# Patient Record
Sex: FEMALE | Race: OTHER | Employment: FULL TIME | URBAN - METROPOLITAN AREA
[De-identification: names, ages, dates, MRNs, and addresses within clinical notes are randomized per-mention and may not be internally consistent; named-entity substitution may affect disease eponyms.]

---

## 2017-01-23 ENCOUNTER — APPOINTMENT (OUTPATIENT)
Dept: GENERAL RADIOLOGY | Age: 47
End: 2017-01-23
Attending: EMERGENCY MEDICINE
Payer: COMMERCIAL

## 2017-01-23 ENCOUNTER — HOSPITAL ENCOUNTER (EMERGENCY)
Age: 47
Discharge: HOME | End: 2017-01-23
Attending: EMERGENCY MEDICINE
Payer: COMMERCIAL

## 2017-01-23 VITALS
SYSTOLIC BLOOD PRESSURE: 122 MMHG | TEMPERATURE: 98 F | OXYGEN SATURATION: 99 % | DIASTOLIC BLOOD PRESSURE: 73 MMHG | BODY MASS INDEX: 42.69 KG/M2 | WEIGHT: 232 LBS | RESPIRATION RATE: 20 BRPM | HEART RATE: 81 BPM | HEIGHT: 62 IN

## 2017-01-23 DIAGNOSIS — J01.11 ACUTE RECURRENT FRONTAL SINUSITIS: ICD-10-CM

## 2017-01-23 DIAGNOSIS — J40 BRONCHITIS: Primary | ICD-10-CM

## 2017-01-23 PROCEDURE — 71020 XR CHEST PA + LAT CHEST (CPT=71020): CPT

## 2017-01-23 PROCEDURE — 99283 EMERGENCY DEPT VISIT LOW MDM: CPT

## 2017-01-24 NOTE — ED INITIAL ASSESSMENT (HPI)
PT STS COUGH AND NASAL CONGESTION X 2 WEEKS. STS HAS SEEN PMD AND GIVEN ANTIBIOTICS FOR SAME. STS NO RELIEF SINCE.

## 2017-01-24 NOTE — ED PROVIDER NOTES
Patient Seen in: [facility] In [place]    History   Patient presents with:  Cough/URI    Stated Complaint: cough / sinus presssure    HPI    52-year-old female with history of chronic rhinitis among other medical problems outlined bel times daily with meals.    HYDROcodone-acetaminophen 5-325 MG Oral Tab,  1/2-2 tabs PO q 4-6 hours prn pain       Family History   Problem Relation Age of Onset   • Lipids Father    • Diabetes Father    • Hypertension Father    • Obesity Father    • Diabete respiratory distress. She has no wheezes. Abdominal: Soft. She exhibits no distension. There is no tenderness. There is no rebound. Musculoskeletal: Normal range of motion. She exhibits no tenderness.    Neurological: She is alert and oriented to person sinusitis    Disposition:  Discharge    Follow-up:  Raul Bills, [address] [id number]    Call in 1 day  Return to the ER if you feel worse in any way, Return to the ER if you have any concerns      Medications Prescri

## 2017-03-07 PROCEDURE — 87046 STOOL CULTR AEROBIC BACT EA: CPT | Performed by: FAMILY MEDICINE

## 2017-03-07 PROCEDURE — 87269 GIARDIA AG IF: CPT | Performed by: FAMILY MEDICINE

## 2017-03-07 PROCEDURE — 87015 SPECIMEN INFECT AGNT CONCNTJ: CPT | Performed by: FAMILY MEDICINE

## 2017-03-07 PROCEDURE — 87177 OVA AND PARASITES SMEARS: CPT | Performed by: FAMILY MEDICINE

## 2017-03-07 PROCEDURE — 87209 SMEAR COMPLEX STAIN: CPT | Performed by: FAMILY MEDICINE

## 2017-03-07 PROCEDURE — 87045 FECES CULTURE AEROBIC BACT: CPT | Performed by: FAMILY MEDICINE

## 2017-03-07 PROCEDURE — 87427 SHIGA-LIKE TOXIN AG IA: CPT | Performed by: FAMILY MEDICINE

## 2017-04-28 PROCEDURE — 87510 GARDNER VAG DNA DIR PROBE: CPT | Performed by: EMERGENCY MEDICINE

## 2017-04-28 PROCEDURE — 87660 TRICHOMONAS VAGIN DIR PROBE: CPT | Performed by: EMERGENCY MEDICINE

## 2017-04-28 PROCEDURE — 87480 CANDIDA DNA DIR PROBE: CPT | Performed by: EMERGENCY MEDICINE

## 2017-04-28 PROCEDURE — 87491 CHLMYD TRACH DNA AMP PROBE: CPT | Performed by: EMERGENCY MEDICINE

## 2017-04-28 PROCEDURE — 87591 N.GONORRHOEAE DNA AMP PROB: CPT | Performed by: EMERGENCY MEDICINE

## 2017-05-17 ENCOUNTER — HOSPITAL ENCOUNTER (EMERGENCY)
Age: 47
Discharge: HOME | End: 2017-05-17
Attending: EMERGENCY MEDICINE
Payer: COMMERCIAL

## 2017-05-17 ENCOUNTER — APPOINTMENT (OUTPATIENT)
Dept: GENERAL RADIOLOGY | Age: 47
End: 2017-05-17
Attending: EMERGENCY MEDICINE
Payer: COMMERCIAL

## 2017-05-17 VITALS
HEIGHT: 62 IN | DIASTOLIC BLOOD PRESSURE: 45 MMHG | BODY MASS INDEX: 42.69 KG/M2 | TEMPERATURE: 98 F | OXYGEN SATURATION: 98 % | WEIGHT: 232 LBS | RESPIRATION RATE: 20 BRPM | SYSTOLIC BLOOD PRESSURE: 123 MMHG | HEART RATE: 92 BPM

## 2017-05-17 DIAGNOSIS — J22 LOWER RESP. TRACT INFECTION: Primary | ICD-10-CM

## 2017-05-17 PROCEDURE — 99283 EMERGENCY DEPT VISIT LOW MDM: CPT

## 2017-05-17 PROCEDURE — 71020 XR CHEST PA + LAT CHEST (CPT=71020): CPT | Performed by: EMERGENCY MEDICINE

## 2017-05-17 RX ORDER — PREDNISONE 20 MG/1
40 TABLET ORAL DAILY
Qty: 10 TABLET | Refills: 0 | Status: SHIPPED | OUTPATIENT
Start: 2017-05-17 | End: 2017-05-22 | Stop reason: ALTCHOICE

## 2017-05-17 RX ORDER — PROMETHAZINE HYDROCHLORIDE AND CODEINE PHOSPHATE 6.25; 1 MG/5ML; MG/5ML
5 SYRUP ORAL EVERY 4 HOURS PRN
Qty: 120 ML | Refills: 0 | Status: SHIPPED | OUTPATIENT
Start: 2017-05-17 | End: 2017-06-16

## 2017-05-17 RX ORDER — ALBUTEROL SULFATE 90 UG/1
2 AEROSOL, METERED RESPIRATORY (INHALATION) EVERY 4 HOURS PRN
Qty: 1 INHALER | Refills: 0 | Status: SHIPPED | OUTPATIENT
Start: 2017-05-17 | End: 2017-05-22

## 2017-05-17 NOTE — ED PROVIDER NOTES
Patient Seen in: THE [facility] Department In [place]    History   Patient presents with:  Cough/URI    Stated Complaint: cough    HPI    56-year-old woman comes in for cough days. Chest hurts with coughing. Cough is dry.   She has had tactile feve (ACCU-CHEK SMARTVIEW) In Vitro Strip,  Test BID as directed   multivitamin (TAB-A-TREVOR/BETA CAROTENE) Oral Tab,  Take 1 tablet by mouth daily.        Family History   Problem Relation Age of Onset   • Lipids Father    • Diabetes Father    • Hypertension Fat Exam reveals no gallop and no friction rub. No murmur heard. Pulmonary/Chest: Effort normal and breath sounds normal. No stridor. No respiratory distress. She has no wheezes. She has no rales. Abdominal: Soft. She exhibits no distension.  There is no needed for cough. Qty: 120 mL Refills: 0    !! Albuterol Sulfate  (90 Base) MCG/ACT Inhalation Aero Soln  Inhale 2 puffs into the lungs every 4 (four) hours as needed for Wheezing.   Qty: 1 Inhaler Refills: 0    !! - Potential duplicate medications

## 2017-07-11 PROCEDURE — 87510 GARDNER VAG DNA DIR PROBE: CPT | Performed by: NURSE PRACTITIONER

## 2017-07-11 PROCEDURE — 87660 TRICHOMONAS VAGIN DIR PROBE: CPT | Performed by: NURSE PRACTITIONER

## 2017-07-11 PROCEDURE — 87480 CANDIDA DNA DIR PROBE: CPT | Performed by: NURSE PRACTITIONER

## 2017-07-11 PROCEDURE — 87491 CHLMYD TRACH DNA AMP PROBE: CPT | Performed by: NURSE PRACTITIONER

## 2017-07-11 PROCEDURE — 36415 COLL VENOUS BLD VENIPUNCTURE: CPT | Performed by: NURSE PRACTITIONER

## 2017-07-11 PROCEDURE — 87529 HSV DNA AMP PROBE: CPT | Performed by: NURSE PRACTITIONER

## 2017-07-11 PROCEDURE — 87591 N.GONORRHOEAE DNA AMP PROB: CPT | Performed by: NURSE PRACTITIONER

## 2017-07-18 PROCEDURE — 88305 TISSUE EXAM BY PATHOLOGIST: CPT | Performed by: RADIOLOGY

## 2017-07-27 ENCOUNTER — HOSPITAL ENCOUNTER (EMERGENCY)
Age: 47
Discharge: HOME | End: 2017-07-28
Attending: EMERGENCY MEDICINE
Payer: COMMERCIAL

## 2017-07-27 VITALS
OXYGEN SATURATION: 99 % | DIASTOLIC BLOOD PRESSURE: 70 MMHG | HEART RATE: 86 BPM | BODY MASS INDEX: 44 KG/M2 | TEMPERATURE: 98 F | RESPIRATION RATE: 18 BRPM | SYSTOLIC BLOOD PRESSURE: 111 MMHG | WEIGHT: 238.13 LBS

## 2017-07-27 DIAGNOSIS — L30.9 DERMATITIS: Primary | ICD-10-CM

## 2017-07-27 PROCEDURE — 99283 EMERGENCY DEPT VISIT LOW MDM: CPT

## 2017-07-27 PROCEDURE — 87081 CULTURE SCREEN ONLY: CPT | Performed by: EMERGENCY MEDICINE

## 2017-07-27 PROCEDURE — 87430 STREP A AG IA: CPT | Performed by: EMERGENCY MEDICINE

## 2017-07-27 PROCEDURE — 36415 COLL VENOUS BLD VENIPUNCTURE: CPT

## 2017-07-28 LAB
ALBUMIN SERPL-MCNC: 3.6 G/DL (ref 3.5–4.8)
ALP LIVER SERPL-CCNC: 103 U/L (ref 39–100)
ALT SERPL-CCNC: 53 U/L (ref 14–54)
AST SERPL-CCNC: 37 U/L (ref 15–41)
BASOPHILS # BLD AUTO: 0.06 X10(3) UL (ref 0–0.1)
BASOPHILS NFR BLD AUTO: 0.7 %
BILIRUB SERPL-MCNC: 0.4 MG/DL (ref 0.1–2)
BUN BLD-MCNC: 13 MG/DL (ref 8–20)
CALCIUM BLD-MCNC: 8.6 MG/DL (ref 8.3–10.3)
CHLORIDE: 105 MMOL/L (ref 101–111)
CO2: 26 MMOL/L (ref 22–32)
CREAT BLD-MCNC: 0.81 MG/DL (ref 0.55–1.02)
EOSINOPHIL # BLD AUTO: 0.18 X10(3) UL (ref 0–0.3)
EOSINOPHIL NFR BLD AUTO: 2.1 %
ERYTHROCYTE [DISTWIDTH] IN BLOOD BY AUTOMATED COUNT: 13.7 % (ref 11.5–16)
GLUCOSE BLD-MCNC: 161 MG/DL (ref 70–99)
HCT VFR BLD AUTO: 36.5 % (ref 34–50)
HGB BLD-MCNC: 12.6 G/DL (ref 12–16)
IMMATURE GRANULOCYTE COUNT: 0.04 X10(3) UL (ref 0–1)
IMMATURE GRANULOCYTE RATIO %: 0.5 %
LYMPHOCYTES # BLD AUTO: 3.4 X10(3) UL (ref 0.9–4)
LYMPHOCYTES NFR BLD AUTO: 39.4 %
M PROTEIN MFR SERPL ELPH: 7.1 G/DL (ref 6.1–8.3)
MCH RBC QN AUTO: 29 PG (ref 27–33.2)
MCHC RBC AUTO-ENTMCNC: 34.5 G/DL (ref 31–37)
MCV RBC AUTO: 83.9 FL (ref 81–100)
MONOCYTES # BLD AUTO: 0.45 X10(3) UL (ref 0.1–0.6)
MONOCYTES NFR BLD AUTO: 5.2 %
MONOSCREEN: NEGATIVE
NEUTROPHIL ABS PRELIM: 4.5 X10 (3) UL (ref 1.3–6.7)
NEUTROPHILS # BLD AUTO: 4.5 X10(3) UL (ref 1.3–6.7)
NEUTROPHILS NFR BLD AUTO: 52.1 %
PLATELET # BLD AUTO: 272 10(3)UL (ref 150–450)
POTASSIUM SERPL-SCNC: 3.5 MMOL/L (ref 3.6–5.1)
RBC # BLD AUTO: 4.35 X10(6)UL (ref 3.8–5.1)
RED CELL DISTRIBUTION WIDTH-SD: 41.9 FL (ref 35.1–46.3)
SODIUM SERPL-SCNC: 139 MMOL/L (ref 136–144)
WBC # BLD AUTO: 8.6 X10(3) UL (ref 4–13)

## 2017-07-28 PROCEDURE — 80053 COMPREHEN METABOLIC PANEL: CPT | Performed by: EMERGENCY MEDICINE

## 2017-07-28 PROCEDURE — 86403 PARTICLE AGGLUT ANTBDY SCRN: CPT | Performed by: EMERGENCY MEDICINE

## 2017-07-28 PROCEDURE — 85025 COMPLETE CBC W/AUTO DIFF WBC: CPT | Performed by: EMERGENCY MEDICINE

## 2017-07-28 RX ORDER — METHYLPREDNISOLONE 4 MG/1
TABLET ORAL
Qty: 1 PACKAGE | Refills: 0 | Status: SHIPPED | OUTPATIENT
Start: 2017-07-28 | End: 2017-08-02

## 2017-07-28 NOTE — ED PROVIDER NOTES
Patient Seen in: THE [facility] Emergency Department In [place]    History   Patient presents with:  Rash Skin Problem (integumentary)    Stated Complaint: rash to abd and legs after travel from [place]    HPI    The patient just returned from vacation in [facility] MCG/ACT Inhalation Aero Soln,  Inhale 1-2 puffs into the lungs every 6 (six) hours as needed for Wheezing. Levonorgestrel 20 MCG/24HR Intrauterine IUD,  1 each by Intrauterine route once.    ACCU-CHEK FASTCLIX LANCETS Does not apply Misc,  Test BID as dir sandpapery to palpation. Palms and soles spared. HEENT: Tympanic membrane's, conjunctivae normal.  No scleral icterus. Oropharynx moist.  Throat erythematous.   Neck: Supple without adenopathy  Lungs: Clear  Abdomen: Soft and nontender without mass or HS medications    methylPREDNISolone 4 MG Oral Tablet Therapy Pack  Dosepack: use as directed on packaging, Normal, Disp-1 Package, R-0

## 2018-05-01 ENCOUNTER — APPOINTMENT (OUTPATIENT)
Dept: GENERAL RADIOLOGY | Facility: HOSPITAL | Age: 48
End: 2018-05-01
Attending: EMERGENCY MEDICINE
Payer: COMMERCIAL

## 2018-05-01 ENCOUNTER — HOSPITAL ENCOUNTER (EMERGENCY)
Facility: HOSPITAL | Age: 48
Discharge: HOME | End: 2018-05-01
Attending: EMERGENCY MEDICINE
Payer: COMMERCIAL

## 2018-05-01 VITALS
BODY MASS INDEX: 42.33 KG/M2 | RESPIRATION RATE: 18 BRPM | HEART RATE: 78 BPM | OXYGEN SATURATION: 99 % | DIASTOLIC BLOOD PRESSURE: 74 MMHG | TEMPERATURE: 97 F | WEIGHT: 230 LBS | SYSTOLIC BLOOD PRESSURE: 93 MMHG | HEIGHT: 62 IN

## 2018-05-01 DIAGNOSIS — R07.9 CHEST PAIN OF UNCERTAIN ETIOLOGY: Primary | ICD-10-CM

## 2018-05-01 PROCEDURE — 84484 ASSAY OF TROPONIN QUANT: CPT | Performed by: EMERGENCY MEDICINE

## 2018-05-01 PROCEDURE — 85025 COMPLETE CBC W/AUTO DIFF WBC: CPT | Performed by: EMERGENCY MEDICINE

## 2018-05-01 PROCEDURE — 93005 ELECTROCARDIOGRAM TRACING: CPT

## 2018-05-01 PROCEDURE — 36415 COLL VENOUS BLD VENIPUNCTURE: CPT

## 2018-05-01 PROCEDURE — 80053 COMPREHEN METABOLIC PANEL: CPT | Performed by: EMERGENCY MEDICINE

## 2018-05-01 PROCEDURE — 85378 FIBRIN DEGRADE SEMIQUANT: CPT | Performed by: EMERGENCY MEDICINE

## 2018-05-01 PROCEDURE — 93010 ELECTROCARDIOGRAM REPORT: CPT

## 2018-05-01 PROCEDURE — 99285 EMERGENCY DEPT VISIT HI MDM: CPT

## 2018-05-01 PROCEDURE — 71046 X-RAY EXAM CHEST 2 VIEWS: CPT | Performed by: EMERGENCY MEDICINE

## 2018-05-01 PROCEDURE — 81003 URINALYSIS AUTO W/O SCOPE: CPT | Performed by: EMERGENCY MEDICINE

## 2018-05-01 NOTE — ED PROVIDER NOTES
Patient Seen in: [facility] Emergency Department    History   Patient presents with: Other    Stated Complaint: burning in her throat with some left sided head pain hearing is muffled and her*    HPI    52-year-old female presents with chest pain. hearing is muffled and her*  Other systems are as noted in HPI. Constitutional and vital signs reviewed. All other systems reviewed and negative except as noted above.     Physical Exam   ED Triage Vitals [05/01/18 1240]  BP: 102/64  Pulse: 78  Resp: 95% negative predictive value  for venous thromboembolism when the D-Dimer is greater than 0.50 ug/mL (FEU). Proceed with caution from clinical presentation. POCT PREGNANCY, URINE - Normal   CBC WITH DIFFERENTIAL WITH PLATELET    Narrative:      The f and pulmonary vessels are normal caliber. Mediastinal contours are smooth. No pneumothorax. CONCLUSION:  Improving peribronchial cuffing.      Dictated by: Carlos Beckman MD on 5/01/2018 at 14:52     Approved by: Carlos Beckman MD              [facility]-a diagnosis)    Disposition:  Discharge  5/1/2018  5:26 pm    Follow-up:  Courtney Mcdonald DO  1947 [address]  [address] [facility]  [phone]    Schedule an appointment as soon as possible for a visit      Mikal Zapien MD  [address]

## 2018-05-01 NOTE — ED INITIAL ASSESSMENT (HPI)
Pt c/o sharp chest pain in the upper left portion of her chest since Thursday; today, Pt states she developed heart burn, shortness of breath, headache on the left portion of head, and \"heaviness\" to left side of her body

## 2018-10-05 PROBLEM — E53.8 B12 DEFICIENCY: Status: ACTIVE | Noted: 2018-10-05

## 2018-10-05 PROBLEM — E55.9 VITAMIN D DEFICIENCY: Status: ACTIVE | Noted: 2018-10-05

## 2018-10-18 PROBLEM — M51.369 DDD (DEGENERATIVE DISC DISEASE), LUMBAR: Status: ACTIVE | Noted: 2018-10-18

## 2018-10-18 PROBLEM — M51.36 DDD (DEGENERATIVE DISC DISEASE), LUMBAR: Status: ACTIVE | Noted: 2018-10-18

## 2018-10-18 PROBLEM — M54.16 LUMBAR RADICULITIS: Status: ACTIVE | Noted: 2018-10-18

## 2018-11-09 PROCEDURE — 83525 ASSAY OF INSULIN: CPT | Performed by: INTERNAL MEDICINE

## 2018-11-09 PROCEDURE — 82043 UR ALBUMIN QUANTITATIVE: CPT | Performed by: INTERNAL MEDICINE

## 2018-11-09 PROCEDURE — 82570 ASSAY OF URINE CREATININE: CPT | Performed by: INTERNAL MEDICINE

## 2019-02-18 ENCOUNTER — APPOINTMENT (OUTPATIENT)
Dept: GENERAL RADIOLOGY | Facility: HOSPITAL | Age: 49
End: 2019-02-18
Payer: COMMERCIAL

## 2019-02-18 ENCOUNTER — HOSPITAL ENCOUNTER (EMERGENCY)
Facility: HOSPITAL | Age: 49
Discharge: HOME | End: 2019-02-18
Attending: EMERGENCY MEDICINE
Payer: COMMERCIAL

## 2019-02-18 VITALS
RESPIRATION RATE: 16 BRPM | HEART RATE: 75 BPM | TEMPERATURE: 98 F | DIASTOLIC BLOOD PRESSURE: 64 MMHG | SYSTOLIC BLOOD PRESSURE: 108 MMHG | WEIGHT: 222 LBS | BODY MASS INDEX: 40.85 KG/M2 | HEIGHT: 62 IN | OXYGEN SATURATION: 100 %

## 2019-02-18 DIAGNOSIS — R07.9 CHEST PAIN OF UNCERTAIN ETIOLOGY: Primary | ICD-10-CM

## 2019-02-18 LAB
ALBUMIN SERPL-MCNC: 3.7 G/DL (ref 3.4–5)
ALBUMIN/GLOB SERPL: 1 {RATIO} (ref 1–2)
ALP LIVER SERPL-CCNC: 118 U/L (ref 39–100)
ALT SERPL-CCNC: 48 U/L (ref 13–56)
ANION GAP SERPL CALC-SCNC: 7 MMOL/L (ref 0–18)
AST SERPL-CCNC: 21 U/L (ref 15–37)
ATRIAL RATE: 72 BPM
BASOPHILS # BLD AUTO: 0.05 X10(3) UL (ref 0–0.2)
BASOPHILS NFR BLD AUTO: 0.8 %
BILIRUB SERPL-MCNC: 0.4 MG/DL (ref 0.1–2)
BUN BLD-MCNC: 12 MG/DL (ref 7–18)
BUN/CREAT SERPL: 11.3 (ref 10–20)
CALCIUM BLD-MCNC: 8.9 MG/DL (ref 8.5–10.1)
CHLORIDE SERPL-SCNC: 105 MMOL/L (ref 98–107)
CO2 SERPL-SCNC: 27 MMOL/L (ref 21–32)
CREAT BLD-MCNC: 1.06 MG/DL (ref 0.55–1.02)
D-DIMER: 0.36 UG/ML FEU (ref 0–0.49)
DEPRECATED RDW RBC AUTO: 40.9 FL (ref 35.1–46.3)
EOSINOPHIL # BLD AUTO: 0.18 X10(3) UL (ref 0–0.7)
EOSINOPHIL NFR BLD AUTO: 2.9 %
ERYTHROCYTE [DISTWIDTH] IN BLOOD BY AUTOMATED COUNT: 13.2 % (ref 11–15)
GLOBULIN PLAS-MCNC: 3.6 G/DL (ref 2.8–4.4)
GLUCOSE BLD-MCNC: 121 MG/DL (ref 70–99)
HCT VFR BLD AUTO: 36.9 % (ref 35–48)
HGB BLD-MCNC: 12 G/DL (ref 12–16)
IMM GRANULOCYTES # BLD AUTO: 0.02 X10(3) UL (ref 0–1)
IMM GRANULOCYTES NFR BLD: 0.3 %
LYMPHOCYTES # BLD AUTO: 3.37 X10(3) UL (ref 1–4)
LYMPHOCYTES NFR BLD AUTO: 55.1 %
M PROTEIN MFR SERPL ELPH: 7.3 G/DL (ref 6.4–8.2)
MCH RBC QN AUTO: 27.5 PG (ref 26–34)
MCHC RBC AUTO-ENTMCNC: 32.5 G/DL (ref 31–37)
MCV RBC AUTO: 84.6 FL (ref 80–100)
MONOCYTES # BLD AUTO: 0.51 X10(3) UL (ref 0.1–1)
MONOCYTES NFR BLD AUTO: 8.3 %
NEUTROPHILS # BLD AUTO: 1.99 X10 (3) UL (ref 1.5–7.7)
NEUTROPHILS # BLD AUTO: 1.99 X10(3) UL (ref 1.5–7.7)
NEUTROPHILS NFR BLD AUTO: 32.6 %
OSMOLALITY SERPL CALC.SUM OF ELEC: 289 MOSM/KG (ref 275–295)
P AXIS: 24 DEGREES
P-R INTERVAL: 170 MS
PLATELET # BLD AUTO: 270 10(3)UL (ref 150–450)
POTASSIUM SERPL-SCNC: 3.7 MMOL/L (ref 3.5–5.1)
Q-T INTERVAL: 388 MS
QRS DURATION: 80 MS
QTC CALCULATION (BEZET): 424 MS
R AXIS: -8 DEGREES
RBC # BLD AUTO: 4.36 X10(6)UL (ref 3.8–5.3)
SODIUM SERPL-SCNC: 139 MMOL/L (ref 136–145)
T AXIS: 30 DEGREES
TROPONIN I SERPL-MCNC: <0.045 NG/ML (ref ?–0.04)
TROPONIN I SERPL-MCNC: <0.045 NG/ML (ref ?–0.04)
VENTRICULAR RATE: 72 BPM
WBC # BLD AUTO: 6.1 X10(3) UL (ref 4–11)

## 2019-02-18 PROCEDURE — 99285 EMERGENCY DEPT VISIT HI MDM: CPT | Performed by: EMERGENCY MEDICINE

## 2019-02-18 PROCEDURE — 80053 COMPREHEN METABOLIC PANEL: CPT

## 2019-02-18 PROCEDURE — 85025 COMPLETE CBC W/AUTO DIFF WBC: CPT

## 2019-02-18 PROCEDURE — 71045 X-RAY EXAM CHEST 1 VIEW: CPT | Performed by: EMERGENCY MEDICINE

## 2019-02-18 PROCEDURE — 85378 FIBRIN DEGRADE SEMIQUANT: CPT | Performed by: EMERGENCY MEDICINE

## 2019-02-18 PROCEDURE — 93010 ELECTROCARDIOGRAM REPORT: CPT | Performed by: EMERGENCY MEDICINE

## 2019-02-18 PROCEDURE — 93005 ELECTROCARDIOGRAM TRACING: CPT

## 2019-02-18 PROCEDURE — 36415 COLL VENOUS BLD VENIPUNCTURE: CPT | Performed by: EMERGENCY MEDICINE

## 2019-02-18 PROCEDURE — 80053 COMPREHEN METABOLIC PANEL: CPT | Performed by: EMERGENCY MEDICINE

## 2019-02-18 PROCEDURE — 84484 ASSAY OF TROPONIN QUANT: CPT

## 2019-02-18 PROCEDURE — 84484 ASSAY OF TROPONIN QUANT: CPT | Performed by: EMERGENCY MEDICINE

## 2019-02-18 PROCEDURE — 85025 COMPLETE CBC W/AUTO DIFF WBC: CPT | Performed by: EMERGENCY MEDICINE

## 2019-02-18 NOTE — ED PROVIDER NOTES
Patient Seen in: [facility] Emergency Department    History   Patient presents with:  Chest Pain Angina (cardiovascular)    Stated Complaint: chest pain    HPI    56-year-old  female who presents to the emergency room today for complete of c quittin.7      Smokeless tobacco: Never Used    Alcohol use: Yes      Alcohol/week: 0.0 - 0.6 oz      Comment: Socially    Drug use: No      Review of Systems    Positive for stated complaint: chest pain  Other systems are as noted in HPI.   Constituti value of  approximately 95% when results are used as part of the total clinical  evaluation of the patient.     1st Trimester:  0.05-0.95 ug/mL (FEU)     2nd Trimester:  0.32-1.29 ug/mL (FEU)    3rd Trimester:  0.13-1.70 ug/mL (FEU)    In pregnant females, was placed on a cardiac monitor was observed. Patient's symptoms remained abated while she was here. Laboratory workup here was unremarkable.   EKG x2 were normal.  Troponin x2 were normal.  I contacted the cardiologist Dr. Evon Peters and we discussed the case

## 2019-02-18 NOTE — ED INITIAL ASSESSMENT (HPI)
Pt here with c/o chest pains since yesterday, feels like pressure across entire chest. Pt reports cold like symptoms since last Thursday.

## 2019-02-19 LAB
ATRIAL RATE: 75 BPM
P AXIS: 36 DEGREES
P-R INTERVAL: 172 MS
Q-T INTERVAL: 388 MS
QRS DURATION: 82 MS
QTC CALCULATION (BEZET): 433 MS
R AXIS: -9 DEGREES
T AXIS: 24 DEGREES
VENTRICULAR RATE: 75 BPM

## 2019-03-06 PROBLEM — E66.9 DIABETES MELLITUS TYPE 2 IN OBESE  (HCC): Status: ACTIVE | Noted: 2019-03-06

## 2019-03-06 PROBLEM — E11.69 DIABETES MELLITUS TYPE 2 IN OBESE: Status: ACTIVE | Noted: 2019-03-06

## 2019-03-06 PROBLEM — E11.69 DIABETES MELLITUS TYPE 2 IN OBESE  (HCC): Status: ACTIVE | Noted: 2019-03-06

## 2019-03-06 PROBLEM — E66.9 DIABETES MELLITUS TYPE 2 IN OBESE (HCC): Status: ACTIVE | Noted: 2019-03-06

## 2019-03-06 PROBLEM — E66.9 DIABETES MELLITUS TYPE 2 IN OBESE: Status: ACTIVE | Noted: 2019-03-06

## 2019-03-06 PROBLEM — E11.69 DIABETES MELLITUS TYPE 2 IN OBESE (HCC): Status: ACTIVE | Noted: 2019-03-06

## 2019-04-26 PROCEDURE — 83525 ASSAY OF INSULIN: CPT | Performed by: INTERNAL MEDICINE

## 2019-04-26 PROCEDURE — 82397 CHEMILUMINESCENT ASSAY: CPT | Performed by: INTERNAL MEDICINE

## 2019-05-10 ENCOUNTER — HOSPITAL ENCOUNTER (EMERGENCY)
Age: 49
Discharge: HOME | End: 2019-05-10
Attending: EMERGENCY MEDICINE
Payer: COMMERCIAL

## 2019-05-10 ENCOUNTER — APPOINTMENT (OUTPATIENT)
Dept: MRI IMAGING | Age: 49
End: 2019-05-10
Attending: EMERGENCY MEDICINE
Payer: COMMERCIAL

## 2019-05-10 VITALS
DIASTOLIC BLOOD PRESSURE: 65 MMHG | TEMPERATURE: 98 F | RESPIRATION RATE: 16 BRPM | BODY MASS INDEX: 39.75 KG/M2 | OXYGEN SATURATION: 99 % | HEIGHT: 62 IN | WEIGHT: 216 LBS | SYSTOLIC BLOOD PRESSURE: 118 MMHG | HEART RATE: 73 BPM

## 2019-05-10 DIAGNOSIS — M48.02 CERVICAL STENOSIS OF SPINE: ICD-10-CM

## 2019-05-10 DIAGNOSIS — R20.2 PARESTHESIAS: Primary | ICD-10-CM

## 2019-05-10 PROCEDURE — 81003 URINALYSIS AUTO W/O SCOPE: CPT | Performed by: EMERGENCY MEDICINE

## 2019-05-10 PROCEDURE — A9575 INJ GADOTERATE MEGLUMI 0.1ML: HCPCS | Performed by: EMERGENCY MEDICINE

## 2019-05-10 PROCEDURE — 36415 COLL VENOUS BLD VENIPUNCTURE: CPT

## 2019-05-10 PROCEDURE — 70553 MRI BRAIN STEM W/O & W/DYE: CPT | Performed by: EMERGENCY MEDICINE

## 2019-05-10 PROCEDURE — 93010 ELECTROCARDIOGRAM REPORT: CPT

## 2019-05-10 PROCEDURE — 85025 COMPLETE CBC W/AUTO DIFF WBC: CPT | Performed by: EMERGENCY MEDICINE

## 2019-05-10 PROCEDURE — 80053 COMPREHEN METABOLIC PANEL: CPT | Performed by: EMERGENCY MEDICINE

## 2019-05-10 PROCEDURE — 99285 EMERGENCY DEPT VISIT HI MDM: CPT

## 2019-05-10 PROCEDURE — 99284 EMERGENCY DEPT VISIT MOD MDM: CPT

## 2019-05-10 PROCEDURE — 93005 ELECTROCARDIOGRAM TRACING: CPT

## 2019-05-10 NOTE — ED INITIAL ASSESSMENT (HPI)
Pt c/o left face, arm and leg onset this am-woke up with these symptoms. Pt denies any slurred speech or speaking. \"feels exhausted\". Pt denies cp or sob.

## 2019-05-10 NOTE — ED PROVIDER NOTES
Patient Seen in: [facility] In [place]    History   Patient presents with:  Numbness Weakness (neurologic)    Stated Complaint: sent from PMD for numbness on L side of face and L leg since this am    HPI    50-year-old female presents Systems    Positive for stated complaint: sent from PMD for numbness on L side of face and L leg since this am  Other systems are as noted in HPI. Constitutional and vital signs reviewed. All other systems reviewed and negative except as noted above. PLATELET.   Procedure                               Abnormality         Status                     ---------                               -----------         ------                     CBC W/ DIFFERENTIAL[[id number]]                              Final resul

## 2019-07-15 PROCEDURE — 87086 URINE CULTURE/COLONY COUNT: CPT | Performed by: NURSE PRACTITIONER

## 2019-12-02 ENCOUNTER — HOSPITAL ENCOUNTER (EMERGENCY)
Age: 49
Discharge: HOME | End: 2019-12-02
Attending: EMERGENCY MEDICINE
Payer: COMMERCIAL

## 2019-12-02 ENCOUNTER — APPOINTMENT (OUTPATIENT)
Dept: ULTRASOUND IMAGING | Age: 49
End: 2019-12-02
Attending: PHYSICIAN ASSISTANT
Payer: COMMERCIAL

## 2019-12-02 ENCOUNTER — APPOINTMENT (OUTPATIENT)
Dept: CT IMAGING | Age: 49
End: 2019-12-02
Attending: PHYSICIAN ASSISTANT
Payer: COMMERCIAL

## 2019-12-02 VITALS
SYSTOLIC BLOOD PRESSURE: 105 MMHG | BODY MASS INDEX: 40.67 KG/M2 | DIASTOLIC BLOOD PRESSURE: 47 MMHG | OXYGEN SATURATION: 97 % | HEART RATE: 79 BPM | TEMPERATURE: 97 F | RESPIRATION RATE: 16 BRPM | HEIGHT: 62 IN | WEIGHT: 221 LBS

## 2019-12-02 DIAGNOSIS — N83.201 RIGHT OVARIAN CYST: Primary | ICD-10-CM

## 2019-12-02 PROBLEM — R09.81 NASAL CONGESTION: Status: ACTIVE | Noted: 2019-12-02

## 2019-12-02 PROBLEM — G43.009 MIGRAINE WITHOUT AURA AND WITHOUT STATUS MIGRAINOSUS, NOT INTRACTABLE: Status: ACTIVE | Noted: 2019-12-02

## 2019-12-02 PROBLEM — J32.9 RECURRENT SINUS INFECTIONS: Status: ACTIVE | Noted: 2019-12-02

## 2019-12-02 PROCEDURE — 80053 COMPREHEN METABOLIC PANEL: CPT | Performed by: PHYSICIAN ASSISTANT

## 2019-12-02 PROCEDURE — 93975 VASCULAR STUDY: CPT | Performed by: PHYSICIAN ASSISTANT

## 2019-12-02 PROCEDURE — 85025 COMPLETE CBC W/AUTO DIFF WBC: CPT | Performed by: EMERGENCY MEDICINE

## 2019-12-02 PROCEDURE — 76856 US EXAM PELVIC COMPLETE: CPT | Performed by: PHYSICIAN ASSISTANT

## 2019-12-02 PROCEDURE — 99284 EMERGENCY DEPT VISIT MOD MDM: CPT

## 2019-12-02 PROCEDURE — 81003 URINALYSIS AUTO W/O SCOPE: CPT | Performed by: EMERGENCY MEDICINE

## 2019-12-02 PROCEDURE — 85025 COMPLETE CBC W/AUTO DIFF WBC: CPT | Performed by: PHYSICIAN ASSISTANT

## 2019-12-02 PROCEDURE — 96360 HYDRATION IV INFUSION INIT: CPT

## 2019-12-02 PROCEDURE — 80053 COMPREHEN METABOLIC PANEL: CPT | Performed by: EMERGENCY MEDICINE

## 2019-12-02 PROCEDURE — 81003 URINALYSIS AUTO W/O SCOPE: CPT | Performed by: PHYSICIAN ASSISTANT

## 2019-12-02 PROCEDURE — 96361 HYDRATE IV INFUSION ADD-ON: CPT

## 2019-12-02 PROCEDURE — 74177 CT ABD & PELVIS W/CONTRAST: CPT | Performed by: PHYSICIAN ASSISTANT

## 2019-12-02 PROCEDURE — 83690 ASSAY OF LIPASE: CPT | Performed by: EMERGENCY MEDICINE

## 2019-12-02 PROCEDURE — 83690 ASSAY OF LIPASE: CPT | Performed by: PHYSICIAN ASSISTANT

## 2019-12-02 PROCEDURE — 81025 URINE PREGNANCY TEST: CPT

## 2019-12-02 PROCEDURE — 76830 TRANSVAGINAL US NON-OB: CPT | Performed by: PHYSICIAN ASSISTANT

## 2019-12-02 RX ORDER — HYDROCODONE BITARTRATE AND ACETAMINOPHEN 5; 325 MG/1; MG/1
1 TABLET ORAL ONCE
Status: DISCONTINUED | OUTPATIENT
Start: 2019-12-02 | End: 2019-12-02

## 2019-12-02 RX ORDER — HYDROCODONE BITARTRATE AND ACETAMINOPHEN 5; 325 MG/1; MG/1
1-2 TABLET ORAL EVERY 6 HOURS PRN
Qty: 10 TABLET | Refills: 0 | Status: SHIPPED | OUTPATIENT
Start: 2019-12-02 | End: 2019-12-09

## 2019-12-02 NOTE — ED PROVIDER NOTES
Patient Seen in: THE [facility] Emergency Department In [place]      History   Patient presents with:  Abdomen/Flank Pain (GI/)    Stated Complaint: abd pain    HPI    Patient is a 56-year-old female.   Medical history of morbid obesity, anemia, depression, Smokeless tobacco: Never Used    Alcohol use: Yes      Alcohol/week: 0.0 - 1.0 standard drinks      Comment: Socially    Drug use: No             Review of Systems    Positive for stated complaint: abd pain  Other systems are as noted in HPI.   Constitution 135 (*)     BUN/CREA Ratio 24.2 (*)     Alkaline Phosphatase 118 (*)     All other components within normal limits   LIPASE - Abnormal; Notable for the following components:    Lipase 45 (*)     All other components within normal limits   URINALYSIS, ROUTI is transmitted to the [facility] ([address] [place] [address] of Radiology) [address]) which includes the Dose Index Registry. PATIENT STATED HISTORY:(As transcribed by Technologist)  Patient woke up Friday with right lower quadrant pain.  The p 12/2/2019  PROCEDURE:  US PELVIS EV W DOPPLER (BJQ=23279/33171)+(FTA 53823)  COMPARISON:  [facility], CT, CT APPENDIX ABD/PEL W CONTRAST (CPT=74177), 12/02/2019, 12:40.   INDICATIONS:  abd pain  TECHNIQUE:  Ultrasound of the pelvis was performed with a sullivan management of this patient. Concern for possible appendicitis. IV line established. N.p.o.  Fluid bolus. CBC, CMP, urine pregnancy, urinalysis, lipase, CT then pelvis with IV contrast.      CONCLUSION:   1. Findings concerning for cystitis.   Clinical

## 2019-12-06 PROBLEM — K76.0 FATTY LIVER: Status: ACTIVE | Noted: 2019-12-06

## 2019-12-25 PROBLEM — D25.9 UTERINE LEIOMYOMA, UNSPECIFIED LOCATION: Status: ACTIVE | Noted: 2019-12-25

## 2019-12-25 PROBLEM — N83.201 CYST OF RIGHT OVARY: Status: ACTIVE | Noted: 2019-12-25

## 2019-12-25 PROBLEM — N92.0 MENORRHAGIA WITH REGULAR CYCLE: Status: ACTIVE | Noted: 2019-12-25

## 2020-02-20 ENCOUNTER — APPOINTMENT (OUTPATIENT)
Dept: CT IMAGING | Age: 50
End: 2020-02-20
Attending: EMERGENCY MEDICINE
Payer: COMMERCIAL

## 2020-02-20 ENCOUNTER — HOSPITAL ENCOUNTER (EMERGENCY)
Age: 50
Discharge: HOME | End: 2020-02-20
Attending: EMERGENCY MEDICINE
Payer: COMMERCIAL

## 2020-02-20 VITALS
HEIGHT: 62 IN | RESPIRATION RATE: 18 BRPM | DIASTOLIC BLOOD PRESSURE: 85 MMHG | HEART RATE: 78 BPM | SYSTOLIC BLOOD PRESSURE: 140 MMHG | OXYGEN SATURATION: 100 % | WEIGHT: 214 LBS | BODY MASS INDEX: 39.38 KG/M2 | TEMPERATURE: 97 F

## 2020-02-20 DIAGNOSIS — R10.9 ABDOMINAL PAIN, ACUTE: Primary | ICD-10-CM

## 2020-02-20 LAB
ALBUMIN SERPL-MCNC: 3.5 G/DL (ref 3.4–5)
ALBUMIN/GLOB SERPL: 1 {RATIO} (ref 1–2)
ALP LIVER SERPL-CCNC: 149 U/L (ref 39–100)
ALT SERPL-CCNC: 52 U/L (ref 13–56)
ANION GAP SERPL CALC-SCNC: 7 MMOL/L (ref 0–18)
AST SERPL-CCNC: 30 U/L (ref 15–37)
ATRIAL RATE: 78 BPM
BASOPHILS # BLD AUTO: 0.03 X10(3) UL (ref 0–0.2)
BASOPHILS NFR BLD AUTO: 0.4 %
BILIRUB SERPL-MCNC: 0.4 MG/DL (ref 0.1–2)
BILIRUB UR QL STRIP.AUTO: NEGATIVE
BUN BLD-MCNC: 15 MG/DL (ref 7–18)
BUN/CREAT SERPL: 23.1 (ref 10–20)
CALCIUM BLD-MCNC: 8.7 MG/DL (ref 8.5–10.1)
CHLORIDE SERPL-SCNC: 104 MMOL/L (ref 98–112)
CLARITY UR REFRACT.AUTO: CLEAR
CO2 SERPL-SCNC: 26 MMOL/L (ref 21–32)
COLOR UR AUTO: YELLOW
CREAT BLD-MCNC: 0.65 MG/DL (ref 0.55–1.02)
DEPRECATED RDW RBC AUTO: 39.8 FL (ref 35.1–46.3)
EOSINOPHIL # BLD AUTO: 0.09 X10(3) UL (ref 0–0.7)
EOSINOPHIL NFR BLD AUTO: 1.2 %
ERYTHROCYTE [DISTWIDTH] IN BLOOD BY AUTOMATED COUNT: 13.2 % (ref 11–15)
EXPIRATION DATE: NORMAL
GLOBULIN PLAS-MCNC: 3.6 G/DL (ref 2.8–4.4)
GLUCOSE BLD-MCNC: 262 MG/DL (ref 70–99)
GLUCOSE UR STRIP.AUTO-MCNC: >=1000 MG/DL
HCT VFR BLD AUTO: 38.3 % (ref 35–48)
HGB BLD-MCNC: 12.6 G/DL (ref 12–16)
IMM GRANULOCYTES # BLD AUTO: 0.03 X10(3) UL (ref 0–1)
IMM GRANULOCYTES NFR BLD: 0.4 %
LEUKOCYTE ESTERASE UR QL STRIP.AUTO: NEGATIVE
LIPASE SERPL-CCNC: 55 U/L (ref 73–393)
LYMPHOCYTES # BLD AUTO: 2.81 X10(3) UL (ref 1–4)
LYMPHOCYTES NFR BLD AUTO: 38.6 %
M PROTEIN MFR SERPL ELPH: 7.1 G/DL (ref 6.4–8.2)
MCH RBC QN AUTO: 27.5 PG (ref 26–34)
MCHC RBC AUTO-ENTMCNC: 32.9 G/DL (ref 31–37)
MCV RBC AUTO: 83.6 FL (ref 80–100)
MONOCYTES # BLD AUTO: 0.38 X10(3) UL (ref 0.1–1)
MONOCYTES NFR BLD AUTO: 5.2 %
NEUTROPHILS # BLD AUTO: 3.94 X10 (3) UL (ref 1.5–7.7)
NEUTROPHILS # BLD AUTO: 3.94 X10(3) UL (ref 1.5–7.7)
NEUTROPHILS NFR BLD AUTO: 54.2 %
NITRITE UR QL STRIP.AUTO: NEGATIVE
OSMOLALITY SERPL CALC.SUM OF ELEC: 294 MOSM/KG (ref 275–295)
P AXIS: 10 DEGREES
P-R INTERVAL: 172 MS
PH UR STRIP.AUTO: 5.5 [PH] (ref 4.5–8)
PLATELET # BLD AUTO: 203 10(3)UL (ref 150–450)
POCT LOT NUMBER: NORMAL
POCT URINE PREGNANCY: NEGATIVE
POTASSIUM SERPL-SCNC: 3.7 MMOL/L (ref 3.5–5.1)
PROT UR STRIP.AUTO-MCNC: NEGATIVE MG/DL
Q-T INTERVAL: 382 MS
QRS DURATION: 82 MS
QTC CALCULATION (BEZET): 435 MS
R AXIS: -6 DEGREES
RBC # BLD AUTO: 4.58 X10(6)UL (ref 3.8–5.3)
RBC UR QL AUTO: NEGATIVE
SODIUM SERPL-SCNC: 137 MMOL/L (ref 136–145)
SP GR UR STRIP.AUTO: >=1.03 (ref 1–1.03)
T AXIS: 14 DEGREES
TROPONIN I SERPL-MCNC: <0.045 NG/ML (ref ?–0.04)
UROBILINOGEN UR STRIP.AUTO-MCNC: 0.2 MG/DL
VENTRICULAR RATE: 78 BPM
WBC # BLD AUTO: 7.3 X10(3) UL (ref 4–11)

## 2020-02-20 PROCEDURE — 85025 COMPLETE CBC W/AUTO DIFF WBC: CPT | Performed by: EMERGENCY MEDICINE

## 2020-02-20 PROCEDURE — 83690 ASSAY OF LIPASE: CPT | Performed by: EMERGENCY MEDICINE

## 2020-02-20 PROCEDURE — 74177 CT ABD & PELVIS W/CONTRAST: CPT | Performed by: EMERGENCY MEDICINE

## 2020-02-20 PROCEDURE — 81003 URINALYSIS AUTO W/O SCOPE: CPT | Performed by: EMERGENCY MEDICINE

## 2020-02-20 PROCEDURE — 96361 HYDRATE IV INFUSION ADD-ON: CPT | Performed by: EMERGENCY MEDICINE

## 2020-02-20 PROCEDURE — 93010 ELECTROCARDIOGRAM REPORT: CPT | Performed by: EMERGENCY MEDICINE

## 2020-02-20 PROCEDURE — 84484 ASSAY OF TROPONIN QUANT: CPT | Performed by: EMERGENCY MEDICINE

## 2020-02-20 PROCEDURE — 81025 URINE PREGNANCY TEST: CPT | Performed by: EMERGENCY MEDICINE

## 2020-02-20 PROCEDURE — 99284 EMERGENCY DEPT VISIT MOD MDM: CPT | Performed by: EMERGENCY MEDICINE

## 2020-02-20 PROCEDURE — 80053 COMPREHEN METABOLIC PANEL: CPT | Performed by: EMERGENCY MEDICINE

## 2020-02-20 PROCEDURE — 96374 THER/PROPH/DIAG INJ IV PUSH: CPT | Performed by: EMERGENCY MEDICINE

## 2020-02-20 PROCEDURE — 93005 ELECTROCARDIOGRAM TRACING: CPT

## 2020-02-20 RX ORDER — MAGNESIUM HYDROXIDE/ALUMINUM HYDROXICE/SIMETHICONE 120; 1200; 1200 MG/30ML; MG/30ML; MG/30ML
30 SUSPENSION ORAL ONCE
Status: COMPLETED | OUTPATIENT
Start: 2020-02-20 | End: 2020-02-20

## 2020-02-20 RX ORDER — KETOROLAC TROMETHAMINE 30 MG/ML
15 INJECTION, SOLUTION INTRAMUSCULAR; INTRAVENOUS ONCE
Status: COMPLETED | OUTPATIENT
Start: 2020-02-20 | End: 2020-02-20

## 2020-02-20 RX ORDER — KETOROLAC TROMETHAMINE 30 MG/ML
15 INJECTION, SOLUTION INTRAMUSCULAR; INTRAVENOUS ONCE
Status: DISCONTINUED | OUTPATIENT
Start: 2020-02-20 | End: 2020-02-20

## 2020-02-20 NOTE — ED INITIAL ASSESSMENT (HPI)
C/o LUQ abd pain started yesterday after lunch. nausea but vomiting. Diarrhea multiple times yesterday-none today. Pain radiates to the back.

## 2020-02-20 NOTE — ED PROVIDER NOTES
Patient Seen in: THE [facility] Emergency Department In [place]      History   Patient presents with:  Abdominal Pain    Stated Complaint: left side abd pain     HPI    This is a 41-year-old woman history of diabetes, here with complaint of left upper quadran quittin.7      Smokeless tobacco: Never Used    Alcohol use: Yes      Alcohol/week: 0.0 - 1.0 standard drinks      Comment: Socially    Drug use: No             Review of Systems   All other systems reviewed and are negative.       Positive for stated Trace (*)     All other components within normal limits   TROPONIN I - Normal   CBC WITH DIFFERENTIAL WITH PLATELET    Narrative: The following orders were created for panel order CBC WITH DIFFERENTIAL WITH PLATELET.   Procedure Result Date: 2/20/2020  PROCEDURE:  CT ABDOMEN PELVIS IV CONTRAST, NO ORAL (ER)  COMPARISON:  [facility], CT, CT APPENDIX ABD/PEL W CONTRAST (CPT=74177), 12/02/2019, 12:40.   INDICATIONS:  left side abd pain  TECHNIQUE:  CT scanning was performed from t Dictated by: Delia Hutchins MD on 2/20/2020 at 12:46     Approved by: Delia Hutchins MD on 2/20/2020 at 12:53          EKG normal sinus rhythm, rate of 78, normal intervals, normal axis, no acute ischemic changes no change from previous              MDM   50-yea

## 2020-03-05 PROBLEM — J02.9 ACUTE PHARYNGITIS: Status: ACTIVE | Noted: 2020-03-05

## 2021-01-05 ENCOUNTER — HOSPITAL ENCOUNTER (EMERGENCY)
Age: 51
Discharge: HOME | End: 2021-01-06
Attending: EMERGENCY MEDICINE
Payer: COMMERCIAL

## 2021-01-05 ENCOUNTER — APPOINTMENT (OUTPATIENT)
Dept: GENERAL RADIOLOGY | Age: 51
End: 2021-01-05
Attending: EMERGENCY MEDICINE
Payer: COMMERCIAL

## 2021-01-05 DIAGNOSIS — R06.02 SHORTNESS OF BREATH: ICD-10-CM

## 2021-01-05 DIAGNOSIS — R07.9 CHEST PAIN OF UNCERTAIN ETIOLOGY: Primary | ICD-10-CM

## 2021-01-05 LAB
ALBUMIN SERPL-MCNC: 3.5 G/DL (ref 3.4–5)
ALBUMIN/GLOB SERPL: 1 {RATIO} (ref 1–2)
ALP LIVER SERPL-CCNC: 105 U/L
ALT SERPL-CCNC: 38 U/L
ANION GAP SERPL CALC-SCNC: 5 MMOL/L (ref 0–18)
AST SERPL-CCNC: 25 U/L (ref 15–37)
BASOPHILS # BLD AUTO: 0.05 X10(3) UL (ref 0–0.2)
BASOPHILS NFR BLD AUTO: 1.2 %
BILIRUB SERPL-MCNC: 0.4 MG/DL (ref 0.1–2)
BUN BLD-MCNC: 13 MG/DL (ref 7–18)
BUN/CREAT SERPL: 14.3 (ref 10–20)
CALCIUM BLD-MCNC: 8.4 MG/DL (ref 8.5–10.1)
CHLORIDE SERPL-SCNC: 107 MMOL/L (ref 98–112)
CO2 SERPL-SCNC: 28 MMOL/L (ref 21–32)
CREAT BLD-MCNC: 0.91 MG/DL
D-DIMER: 0.32 UG/ML FEU (ref ?–0.5)
DEPRECATED RDW RBC AUTO: 41.1 FL (ref 35.1–46.3)
EOSINOPHIL # BLD AUTO: 0.1 X10(3) UL (ref 0–0.7)
EOSINOPHIL NFR BLD AUTO: 2.3 %
ERYTHROCYTE [DISTWIDTH] IN BLOOD BY AUTOMATED COUNT: 13.2 % (ref 11–15)
GLOBULIN PLAS-MCNC: 3.6 G/DL (ref 2.8–4.4)
GLUCOSE BLD-MCNC: 219 MG/DL (ref 70–99)
HCT VFR BLD AUTO: 36.1 %
HGB BLD-MCNC: 12.1 G/DL
IMM GRANULOCYTES # BLD AUTO: 0.02 X10(3) UL (ref 0–1)
IMM GRANULOCYTES NFR BLD: 0.5 %
LYMPHOCYTES # BLD AUTO: 1.48 X10(3) UL (ref 1–4)
LYMPHOCYTES NFR BLD AUTO: 34.6 %
M PROTEIN MFR SERPL ELPH: 7.1 G/DL (ref 6.4–8.2)
MCH RBC QN AUTO: 28.3 PG (ref 26–34)
MCHC RBC AUTO-ENTMCNC: 33.5 G/DL (ref 31–37)
MCV RBC AUTO: 84.5 FL
MONOCYTES # BLD AUTO: 0.44 X10(3) UL (ref 0.1–1)
MONOCYTES NFR BLD AUTO: 10.3 %
NEUTROPHILS # BLD AUTO: 2.19 X10 (3) UL (ref 1.5–7.7)
NEUTROPHILS # BLD AUTO: 2.19 X10(3) UL (ref 1.5–7.7)
NEUTROPHILS NFR BLD AUTO: 51.1 %
OSMOLALITY SERPL CALC.SUM OF ELEC: 297 MOSM/KG (ref 275–295)
PLATELET # BLD AUTO: 210 10(3)UL (ref 150–450)
POTASSIUM SERPL-SCNC: 3.7 MMOL/L (ref 3.5–5.1)
RBC # BLD AUTO: 4.27 X10(6)UL
SODIUM SERPL-SCNC: 140 MMOL/L (ref 136–145)
TROPONIN I SERPL-MCNC: <0.045 NG/ML (ref ?–0.04)
WBC # BLD AUTO: 4.3 X10(3) UL (ref 4–11)

## 2021-01-05 PROCEDURE — 80053 COMPREHEN METABOLIC PANEL: CPT | Performed by: EMERGENCY MEDICINE

## 2021-01-05 PROCEDURE — 85379 FIBRIN DEGRADATION QUANT: CPT | Performed by: EMERGENCY MEDICINE

## 2021-01-05 PROCEDURE — 85025 COMPLETE CBC W/AUTO DIFF WBC: CPT | Performed by: EMERGENCY MEDICINE

## 2021-01-05 PROCEDURE — 71045 X-RAY EXAM CHEST 1 VIEW: CPT | Performed by: EMERGENCY MEDICINE

## 2021-01-05 PROCEDURE — 84484 ASSAY OF TROPONIN QUANT: CPT | Performed by: EMERGENCY MEDICINE

## 2021-01-05 PROCEDURE — 93005 ELECTROCARDIOGRAM TRACING: CPT

## 2021-01-05 PROCEDURE — 99285 EMERGENCY DEPT VISIT HI MDM: CPT

## 2021-01-05 PROCEDURE — 93010 ELECTROCARDIOGRAM REPORT: CPT

## 2021-01-05 PROCEDURE — 99284 EMERGENCY DEPT VISIT MOD MDM: CPT

## 2021-01-05 PROCEDURE — 36415 COLL VENOUS BLD VENIPUNCTURE: CPT

## 2021-01-06 VITALS
DIASTOLIC BLOOD PRESSURE: 56 MMHG | HEIGHT: 62 IN | RESPIRATION RATE: 18 BRPM | WEIGHT: 218 LBS | TEMPERATURE: 99 F | SYSTOLIC BLOOD PRESSURE: 110 MMHG | OXYGEN SATURATION: 98 % | HEART RATE: 84 BPM | BODY MASS INDEX: 40.12 KG/M2

## 2021-01-06 LAB
ATRIAL RATE: 84 BPM
P AXIS: 39 DEGREES
P-R INTERVAL: 166 MS
Q-T INTERVAL: 384 MS
QRS DURATION: 88 MS
QTC CALCULATION (BEZET): 453 MS
R AXIS: -7 DEGREES
T AXIS: 40 DEGREES
VENTRICULAR RATE: 84 BPM

## 2021-01-06 NOTE — ED INITIAL ASSESSMENT (HPI)
54 y/o female to ED with c/o of low grade fevers and chest pressure and cough. Patient reports her sister was tested for COVID today, results unknown.

## 2021-01-06 NOTE — ED PROVIDER NOTES
Patient Seen in: THE [facility] Emergency Department In [place]      History   Patient presents with:  Difficulty Breathing    Stated Complaint: cough, SOB, chest pressure    HPI/Subjective:   HPI    Is a pleasant 46-year-old female, with complaints of chest are as noted in HPI. Constitutional and vital signs reviewed. All other systems reviewed and negative except as noted above.     Physical Exam     ED Triage Vitals [01/05/21 2250]   /56   Pulse 83   Resp 18   Temp 98.9 °F (37.2 °C)   Temp src Te Final result                 Please view results for these tests on the individual orders.    POCT PREGNANCY, URINE   RAINBOW DRAW BLUE   RAINBOW DRAW LAVENDER   RAINBOW DRAW LIGHT GREEN   RAINBOW DRAW GOLD   SARS-COV-2 BY PCR (M2

## 2021-01-07 LAB — SARS-COV-2 RNA RESP QL NAA+PROBE: DETECTED

## 2021-01-30 ENCOUNTER — APPOINTMENT (OUTPATIENT)
Dept: GENERAL RADIOLOGY | Age: 51
End: 2021-01-30
Payer: COMMERCIAL

## 2021-01-30 PROCEDURE — 71045 X-RAY EXAM CHEST 1 VIEW: CPT | Performed by: EMERGENCY MEDICINE

## 2021-01-30 NOTE — ED INITIAL ASSESSMENT (HPI)
Pt states PTA while driving felt sob and left arm pain, states had covid 01/07 and pneumonia   01/19

## 2021-01-30 NOTE — ED PROVIDER NOTES
Patient Seen in: THE [facility] Emergency Department In [place]      History   Patient presents with:   Other    Stated Complaint: states while driving had an episode of not being able to breathe     HPI/Subjective:   HPI    81-year-old female presents [place] Years: 5.00        Pack years: 1.5        Types: Cigarettes        Quit date: 2000        Years since quittin.7      Smokeless tobacco: Never Used    Alcohol use:  Yes      Alcohol/week: 0.0 - 1.0 standard drinks      Comment: Socially    Drug use edema 2+ distal pulses. Neuro: Cranial nerves II through XII intact with no gross focal sensory or motor abnormality.       ED Course     Labs Reviewed   COMP METABOLIC PANEL (14) - Abnormal; Notable for the following components:       Result Value    Gluc PULMONARY ARTERIES: No filling defect within the pulmonary arterial tree to suggest acute or chronic pulmonary embolism. HEART/AORTA: The heart is within normal limits in size. There is no significant pericardial effusion.  There is no significant bowing of appearance without dilatation or internal echogenicity. All visualized venous structures compress with demonstrable augmentation and spontaneous color and Doppler waveforms. The visualized contralateral common femoral vein is patent with normal waveforms. represents dependent atelectasis. Dictated by (CST): Batsheva Calvo MD on 1/05/2021 at 11:56 PM     Finalized by (CST): Batsheva Calvo MD on 1/05/2021 at 11:57 PM        I personally reviewed the images myself and went over the results with the patient.

## 2021-02-22 ENCOUNTER — HOSPITAL ENCOUNTER (EMERGENCY)
Age: 51
Discharge: HOME | End: 2021-02-22
Attending: EMERGENCY MEDICINE
Payer: COMMERCIAL

## 2021-02-22 ENCOUNTER — APPOINTMENT (OUTPATIENT)
Dept: CT IMAGING | Age: 51
End: 2021-02-22
Attending: EMERGENCY MEDICINE
Payer: COMMERCIAL

## 2021-02-22 VITALS
WEIGHT: 218 LBS | BODY MASS INDEX: 40.12 KG/M2 | RESPIRATION RATE: 17 BRPM | SYSTOLIC BLOOD PRESSURE: 108 MMHG | TEMPERATURE: 99 F | HEIGHT: 62 IN | HEART RATE: 67 BPM | OXYGEN SATURATION: 96 % | DIASTOLIC BLOOD PRESSURE: 50 MMHG

## 2021-02-22 DIAGNOSIS — U07.1 COVID-19: Primary | ICD-10-CM

## 2021-02-22 DIAGNOSIS — J40 BRONCHITIS: ICD-10-CM

## 2021-02-22 PROCEDURE — 93010 ELECTROCARDIOGRAM REPORT: CPT

## 2021-02-22 PROCEDURE — 71275 CT ANGIOGRAPHY CHEST: CPT | Performed by: EMERGENCY MEDICINE

## 2021-02-22 PROCEDURE — 99284 EMERGENCY DEPT VISIT MOD MDM: CPT

## 2021-02-22 PROCEDURE — 36415 COLL VENOUS BLD VENIPUNCTURE: CPT

## 2021-02-22 PROCEDURE — 93005 ELECTROCARDIOGRAM TRACING: CPT

## 2021-02-22 NOTE — ED PROVIDER NOTES
Patient Seen in: THE [facility] Emergency Department In [place]      History   Patient presents with:  Difficulty Breathing    Stated Complaint: shortness of breath. had COVID a month ago, SOB worse now.     HPI/Subjective:   HPI    Patient diagnosed with COVI Years since quittin.8      Smokeless tobacco: Never Used    Alcohol use: Yes      Alcohol/week: 0.0 - 1.0 standard drinks      Comment: Socially    Drug use: No             Review of Systems    Positive for stated complaint: shortness of breath.  had C (CST): Diane Arce MD on 2/22/2021 at 6:22 AM     Finalized by (CST): Diane Arce MD on 2/22/2021 at 6:28 AM       Xr Chest Ap Portable  (cpt=71045)    Result Date: 1/30/2021  PROCEDURE:  XR CHEST AP PORTABLE  (CPT=71045)  TECHNIQUE:  AP chest radiogra

## 2021-02-22 NOTE — ED INITIAL ASSESSMENT (HPI)
PT to the ED for evaluation of shortness of breath. Pt states she was diagnosed with COVID-19 of 01/09, and has had lingering cough and SOB ever since, but it has gotten significantly worse since midnight. Last used inhaler at 0330.

## 2021-02-23 LAB
ATRIAL RATE: 76 BPM
P AXIS: 31 DEGREES
P-R INTERVAL: 160 MS
Q-T INTERVAL: 394 MS
QRS DURATION: 84 MS
QTC CALCULATION (BEZET): 443 MS
R AXIS: -7 DEGREES
T AXIS: 11 DEGREES
VENTRICULAR RATE: 76 BPM

## 2021-03-11 ENCOUNTER — HOSPITAL ENCOUNTER (EMERGENCY)
Age: 51
Discharge: HOME | End: 2021-03-11
Attending: EMERGENCY MEDICINE
Payer: COMMERCIAL

## 2021-03-11 VITALS
BODY MASS INDEX: 40.12 KG/M2 | WEIGHT: 218 LBS | SYSTOLIC BLOOD PRESSURE: 100 MMHG | RESPIRATION RATE: 18 BRPM | HEART RATE: 81 BPM | OXYGEN SATURATION: 95 % | TEMPERATURE: 97 F | HEIGHT: 62 IN | DIASTOLIC BLOOD PRESSURE: 55 MMHG

## 2021-03-11 DIAGNOSIS — T23.029A BURN OF FINGER, UNSPECIFIED BURN DEGREE, UNSPECIFIED LATERALITY, INITIAL ENCOUNTER: Primary | ICD-10-CM

## 2021-03-11 PROCEDURE — 99283 EMERGENCY DEPT VISIT LOW MDM: CPT | Performed by: EMERGENCY MEDICINE

## 2021-03-11 NOTE — ED PROVIDER NOTES
Patient Seen in: THE [facility] Emergency Department In [place]      History   Patient presents with:  Trauma    Stated Complaint: burn to right thumb. seen at [facility] on sunday to get it drained.  states the pain g*    HPI/Subjective:   HPI  21-year-old female wi Alcohol use: Yes      Alcohol/week: 0.0 - 1.0 standard drinks      Comment: Socially    Drug use: No             Review of Systems   All other systems reviewed and are negative.       Positive for stated complaint: burn to right thumb. seen at [facility] on sunday Discharge Medication List as of 3/11/2021  6:05 PM

## 2021-03-11 NOTE — ED INITIAL ASSESSMENT (HPI)
Patient reports with complaints of burn to right thumb. Patient was seen on Sunday at [facility] for drainage of burn. She states last night the pain woke her up from sleep and began throbbing. Temp of 100.3 at home.  Patient is diabetic

## 2021-04-26 PROBLEM — M51.369 BULGE OF LUMBAR DISC WITHOUT MYELOPATHY: Status: ACTIVE | Noted: 2021-04-26

## 2021-04-26 PROBLEM — M51.36 BULGE OF LUMBAR DISC WITHOUT MYELOPATHY: Status: ACTIVE | Noted: 2021-04-26

## 2021-04-26 PROBLEM — M51.26 BULGE OF LUMBAR DISC WITHOUT MYELOPATHY: Status: ACTIVE | Noted: 2021-04-26

## 2021-05-26 PROBLEM — M25.851 HIP IMPINGEMENT SYNDROME, RIGHT: Status: ACTIVE | Noted: 2021-05-26

## 2021-05-26 PROBLEM — S73.191A TEAR OF RIGHT ACETABULAR LABRUM, INITIAL ENCOUNTER: Status: ACTIVE | Noted: 2021-05-26

## 2021-07-12 ENCOUNTER — APPOINTMENT (OUTPATIENT)
Dept: CT IMAGING | Age: 51
End: 2021-07-12
Attending: EMERGENCY MEDICINE
Payer: COMMERCIAL

## 2021-07-12 ENCOUNTER — HOSPITAL ENCOUNTER (EMERGENCY)
Age: 51
Discharge: HOME | End: 2021-07-12
Attending: EMERGENCY MEDICINE
Payer: COMMERCIAL

## 2021-07-12 VITALS
DIASTOLIC BLOOD PRESSURE: 57 MMHG | HEART RATE: 71 BPM | WEIGHT: 203 LBS | BODY MASS INDEX: 37.36 KG/M2 | OXYGEN SATURATION: 99 % | RESPIRATION RATE: 16 BRPM | TEMPERATURE: 97 F | SYSTOLIC BLOOD PRESSURE: 111 MMHG | HEIGHT: 62 IN

## 2021-07-12 DIAGNOSIS — B02.9 HERPES ZOSTER WITHOUT COMPLICATION: ICD-10-CM

## 2021-07-12 DIAGNOSIS — R10.12 ABDOMINAL PAIN, LEFT UPPER QUADRANT: Primary | ICD-10-CM

## 2021-07-12 LAB
ALBUMIN SERPL-MCNC: 3.8 G/DL (ref 3.4–5)
ALBUMIN/GLOB SERPL: 1 {RATIO} (ref 1–2)
ALP LIVER SERPL-CCNC: 130 U/L
ALT SERPL-CCNC: 69 U/L
ANION GAP SERPL CALC-SCNC: 5 MMOL/L (ref 0–18)
AST SERPL-CCNC: 35 U/L (ref 15–37)
B-HCG UR QL: NEGATIVE
BASOPHILS # BLD AUTO: 0.03 X10(3) UL (ref 0–0.2)
BASOPHILS NFR BLD AUTO: 0.8 %
BILIRUB SERPL-MCNC: 0.6 MG/DL (ref 0.1–2)
BILIRUB UR QL STRIP.AUTO: NEGATIVE
BUN BLD-MCNC: 17 MG/DL (ref 7–18)
BUN/CREAT SERPL: 27.4 (ref 10–20)
CALCIUM BLD-MCNC: 9 MG/DL (ref 8.5–10.1)
CHLORIDE SERPL-SCNC: 104 MMOL/L (ref 98–112)
CLARITY UR REFRACT.AUTO: CLEAR
CO2 SERPL-SCNC: 27 MMOL/L (ref 21–32)
COLOR UR AUTO: YELLOW
CREAT BLD-MCNC: 0.62 MG/DL
DEPRECATED RDW RBC AUTO: 38 FL (ref 35.1–46.3)
EOSINOPHIL # BLD AUTO: 0.09 X10(3) UL (ref 0–0.7)
EOSINOPHIL NFR BLD AUTO: 2.3 %
ERYTHROCYTE [DISTWIDTH] IN BLOOD BY AUTOMATED COUNT: 12.9 % (ref 11–15)
GLOBULIN PLAS-MCNC: 3.7 G/DL (ref 2.8–4.4)
GLUCOSE BLD-MCNC: 138 MG/DL (ref 70–99)
GLUCOSE UR STRIP.AUTO-MCNC: NEGATIVE MG/DL
HCT VFR BLD AUTO: 35.1 %
HGB BLD-MCNC: 12 G/DL
IMM GRANULOCYTES # BLD AUTO: 0.01 X10(3) UL (ref 0–1)
IMM GRANULOCYTES NFR BLD: 0.3 %
KETONES UR STRIP.AUTO-MCNC: NEGATIVE MG/DL
LEUKOCYTE ESTERASE UR QL STRIP.AUTO: NEGATIVE
LIPASE SERPL-CCNC: 113 U/L (ref 73–393)
LYMPHOCYTES # BLD AUTO: 1.62 X10(3) UL (ref 1–4)
LYMPHOCYTES NFR BLD AUTO: 42 %
M PROTEIN MFR SERPL ELPH: 7.5 G/DL (ref 6.4–8.2)
MCH RBC QN AUTO: 27.8 PG (ref 26–34)
MCHC RBC AUTO-ENTMCNC: 34.2 G/DL (ref 31–37)
MCV RBC AUTO: 81.4 FL
MONOCYTES # BLD AUTO: 0.44 X10(3) UL (ref 0.1–1)
MONOCYTES NFR BLD AUTO: 11.4 %
NEUTROPHILS # BLD AUTO: 1.67 X10 (3) UL (ref 1.5–7.7)
NEUTROPHILS # BLD AUTO: 1.67 X10(3) UL (ref 1.5–7.7)
NEUTROPHILS NFR BLD AUTO: 43.2 %
NITRITE UR QL STRIP.AUTO: NEGATIVE
OSMOLALITY SERPL CALC.SUM OF ELEC: 286 MOSM/KG (ref 275–295)
PH UR STRIP.AUTO: 6.5 [PH] (ref 5–8)
PLATELET # BLD AUTO: 196 10(3)UL (ref 150–450)
POTASSIUM SERPL-SCNC: 3.4 MMOL/L (ref 3.5–5.1)
PROT UR STRIP.AUTO-MCNC: NEGATIVE MG/DL
RBC # BLD AUTO: 4.31 X10(6)UL
SODIUM SERPL-SCNC: 136 MMOL/L (ref 136–145)
SP GR UR STRIP.AUTO: 1.02 (ref 1–1.03)
UROBILINOGEN UR STRIP.AUTO-MCNC: 1 MG/DL
WBC # BLD AUTO: 3.9 X10(3) UL (ref 4–11)

## 2021-07-12 PROCEDURE — 99284 EMERGENCY DEPT VISIT MOD MDM: CPT

## 2021-07-12 PROCEDURE — 96374 THER/PROPH/DIAG INJ IV PUSH: CPT

## 2021-07-12 PROCEDURE — 85025 COMPLETE CBC W/AUTO DIFF WBC: CPT | Performed by: EMERGENCY MEDICINE

## 2021-07-12 PROCEDURE — 81003 URINALYSIS AUTO W/O SCOPE: CPT | Performed by: EMERGENCY MEDICINE

## 2021-07-12 PROCEDURE — 81025 URINE PREGNANCY TEST: CPT

## 2021-07-12 PROCEDURE — 80053 COMPREHEN METABOLIC PANEL: CPT | Performed by: EMERGENCY MEDICINE

## 2021-07-12 PROCEDURE — 83690 ASSAY OF LIPASE: CPT | Performed by: EMERGENCY MEDICINE

## 2021-07-12 PROCEDURE — 74177 CT ABD & PELVIS W/CONTRAST: CPT | Performed by: EMERGENCY MEDICINE

## 2021-07-12 RX ORDER — FAMCICLOVIR 500 MG/1
500 TABLET, FILM COATED ORAL 3 TIMES DAILY
Qty: 21 TABLET | Refills: 0 | Status: SHIPPED | OUTPATIENT
Start: 2021-07-12 | End: 2021-07-19

## 2021-07-12 RX ORDER — KETOROLAC TROMETHAMINE 30 MG/ML
30 INJECTION, SOLUTION INTRAMUSCULAR; INTRAVENOUS ONCE
Status: COMPLETED | OUTPATIENT
Start: 2021-07-12 | End: 2021-07-12

## 2021-07-12 NOTE — ED INITIAL ASSESSMENT (HPI)
C/o \"sandpaper\" rash to abdomen x 1 week. Bumps/rash/redness to left upper abdomen since Sunday. Also c/o LUQ pain,diarrhea, nausea. No vomiting.

## 2021-07-13 NOTE — ED PROVIDER NOTES
Patient Seen in: THE [facility] Emergency Department In [place]      History   Patient presents with:  Rash Skin Problem    Stated Complaint: rash with burning to left side of abdomen x1 week    HPI/Subjective:   HPI    Patient is a 52-year-old female present Vaping Use: Never used    Alcohol use: Yes      Alcohol/week: 0.0 - 1.0 standard drinks      Comment: Socially    Drug use:  No             Review of Systems    Positive for stated complaint: rash with burning to left side of abdomen x1 week  Other systems Patient answering all questions appropriately.         ED Course     Labs Reviewed   COMP METABOLIC PANEL (14) - Abnormal; Notable for the following components:       Result Value    Glucose 138 (*)     Potassium 3.4 (*)     BUN/CREA Ratio 27.4 (*)     ALT unremarkable. Liver function tests are unremarkable. Patient's lipase is found to be negative. The patient will be treated for what appears to be zoster at this time. There is no findings noted on CT scan.   Patient was concerned there may be something

## 2021-11-05 PROBLEM — R79.89 ABNORMAL LFTS: Status: ACTIVE | Noted: 2021-11-05

## 2021-11-11 PROBLEM — E78.2 MIXED HYPERLIPIDEMIA: Status: ACTIVE | Noted: 2021-11-11

## 2021-12-15 ENCOUNTER — HOSPITAL ENCOUNTER (EMERGENCY)
Age: 51
Discharge: HOME | End: 2021-12-15
Attending: EMERGENCY MEDICINE
Payer: OTHER MISCELLANEOUS

## 2021-12-15 VITALS
HEIGHT: 62 IN | TEMPERATURE: 97 F | OXYGEN SATURATION: 100 % | BODY MASS INDEX: 34.23 KG/M2 | HEART RATE: 87 BPM | DIASTOLIC BLOOD PRESSURE: 56 MMHG | WEIGHT: 186 LBS | RESPIRATION RATE: 18 BRPM | SYSTOLIC BLOOD PRESSURE: 106 MMHG

## 2021-12-15 DIAGNOSIS — S39.012A LOW BACK STRAIN, INITIAL ENCOUNTER: Primary | ICD-10-CM

## 2021-12-15 PROCEDURE — 99282 EMERGENCY DEPT VISIT SF MDM: CPT

## 2021-12-16 NOTE — ED PROVIDER NOTES
Patient Seen in: THE [facility] Emergency Department In [place]      History   Patient presents with:  Back Pain    Stated Complaint: Pt was hurt at work last Wednesday at [employer], back pain has not gotten any bett*    Subjective:   HPI    50-year-old female p Review of Systems    Positive for stated complaint: Pt was hurt at work last Wednesday at [employer], back pain has not gotten any bett*  Other systems are as noted in HPI. Constitutional and vital signs reviewed.       All other systems reviewed and neg [phone]  Call in 1 day(s)  Work related injury follow up          Medications Prescribed:  Current Discharge Medication List

## 2021-12-16 NOTE — ED INITIAL ASSESSMENT (HPI)
Reports last wed, pt injured her back at work at [employer] ([employer]). ptain has continued on left side and down left leg. Denies loss of control of bowel or bladder.

## 2022-01-06 ENCOUNTER — APPOINTMENT (OUTPATIENT)
Dept: GENERAL RADIOLOGY | Age: 52
End: 2022-01-06
Attending: EMERGENCY MEDICINE
Payer: COMMERCIAL

## 2022-01-06 ENCOUNTER — HOSPITAL ENCOUNTER (EMERGENCY)
Age: 52
Discharge: HOME | End: 2022-01-07
Attending: EMERGENCY MEDICINE
Payer: COMMERCIAL

## 2022-01-06 DIAGNOSIS — R00.2 PALPITATIONS: ICD-10-CM

## 2022-01-06 DIAGNOSIS — R07.89 CHEST PAIN, ATYPICAL: Primary | ICD-10-CM

## 2022-01-06 DIAGNOSIS — M54.12 CERVICAL RADICULOPATHY: ICD-10-CM

## 2022-01-06 LAB
ALBUMIN SERPL-MCNC: 3.2 G/DL (ref 3.4–5)
ALBUMIN/GLOB SERPL: 0.8 {RATIO} (ref 1–2)
ALP LIVER SERPL-CCNC: 167 U/L
ALT SERPL-CCNC: 50 U/L
ANION GAP SERPL CALC-SCNC: 6 MMOL/L (ref 0–18)
AST SERPL-CCNC: 19 U/L (ref 15–37)
BASOPHILS # BLD AUTO: 0.04 X10(3) UL (ref 0–0.2)
BASOPHILS NFR BLD AUTO: 0.8 %
BILIRUB SERPL-MCNC: 0.5 MG/DL (ref 0.1–2)
BUN BLD-MCNC: 17 MG/DL (ref 7–18)
CALCIUM BLD-MCNC: 8.8 MG/DL (ref 8.5–10.1)
CHLORIDE SERPL-SCNC: 107 MMOL/L (ref 98–112)
CO2 SERPL-SCNC: 25 MMOL/L (ref 21–32)
CREAT BLD-MCNC: 0.6 MG/DL
D DIMER PPP FEU-MCNC: 0.37 UG/ML FEU (ref ?–0.5)
EOSINOPHIL # BLD AUTO: 0.07 X10(3) UL (ref 0–0.7)
EOSINOPHIL NFR BLD AUTO: 1.4 %
ERYTHROCYTE [DISTWIDTH] IN BLOOD BY AUTOMATED COUNT: 13.4 %
GLOBULIN PLAS-MCNC: 3.9 G/DL (ref 2.8–4.4)
GLUCOSE BLD-MCNC: 144 MG/DL (ref 70–99)
HCT VFR BLD AUTO: 34 %
HGB BLD-MCNC: 11.3 G/DL
IMM GRANULOCYTES # BLD AUTO: 0 X10(3) UL (ref 0–1)
IMM GRANULOCYTES NFR BLD: 0 %
LIPASE SERPL-CCNC: 73 U/L (ref 73–393)
LYMPHOCYTES # BLD AUTO: 3.01 X10(3) UL (ref 1–4)
LYMPHOCYTES NFR BLD AUTO: 59 %
MCH RBC QN AUTO: 26.9 PG (ref 26–34)
MCHC RBC AUTO-ENTMCNC: 33.2 G/DL (ref 31–37)
MCV RBC AUTO: 81 FL
MONOCYTES # BLD AUTO: 0.37 X10(3) UL (ref 0.1–1)
MONOCYTES NFR BLD AUTO: 7.3 %
NEUTROPHILS # BLD AUTO: 1.61 X10 (3) UL (ref 1.5–7.7)
NEUTROPHILS # BLD AUTO: 1.61 X10(3) UL (ref 1.5–7.7)
NEUTROPHILS NFR BLD AUTO: 31.5 %
NT-PROBNP SERPL-MCNC: 71 PG/ML (ref ?–125)
OSMOLALITY SERPL CALC.SUM OF ELEC: 290 MOSM/KG (ref 275–295)
PLATELET # BLD AUTO: 206 10(3)UL (ref 150–450)
POTASSIUM SERPL-SCNC: 3.6 MMOL/L (ref 3.5–5.1)
PROT SERPL-MCNC: 7.1 G/DL (ref 6.4–8.2)
RBC # BLD AUTO: 4.2 X10(6)UL
SARS-COV-2 RNA RESP QL NAA+PROBE: NOT DETECTED
SODIUM SERPL-SCNC: 138 MMOL/L (ref 136–145)
TROPONIN I HIGH SENSITIVITY: 4 NG/L
WBC # BLD AUTO: 5.1 X10(3) UL (ref 4–11)

## 2022-01-06 PROCEDURE — 80053 COMPREHEN METABOLIC PANEL: CPT | Performed by: EMERGENCY MEDICINE

## 2022-01-06 PROCEDURE — 72050 X-RAY EXAM NECK SPINE 4/5VWS: CPT | Performed by: EMERGENCY MEDICINE

## 2022-01-06 PROCEDURE — 83690 ASSAY OF LIPASE: CPT | Performed by: EMERGENCY MEDICINE

## 2022-01-06 PROCEDURE — 84439 ASSAY OF FREE THYROXINE: CPT | Performed by: EMERGENCY MEDICINE

## 2022-01-06 PROCEDURE — 83880 ASSAY OF NATRIURETIC PEPTIDE: CPT | Performed by: EMERGENCY MEDICINE

## 2022-01-06 PROCEDURE — 84443 ASSAY THYROID STIM HORMONE: CPT | Performed by: EMERGENCY MEDICINE

## 2022-01-06 PROCEDURE — 84484 ASSAY OF TROPONIN QUANT: CPT | Performed by: EMERGENCY MEDICINE

## 2022-01-06 PROCEDURE — 85025 COMPLETE CBC W/AUTO DIFF WBC: CPT | Performed by: EMERGENCY MEDICINE

## 2022-01-06 PROCEDURE — 85379 FIBRIN DEGRADATION QUANT: CPT | Performed by: EMERGENCY MEDICINE

## 2022-01-06 PROCEDURE — 93005 ELECTROCARDIOGRAM TRACING: CPT

## 2022-01-06 PROCEDURE — 99284 EMERGENCY DEPT VISIT MOD MDM: CPT

## 2022-01-06 PROCEDURE — 93010 ELECTROCARDIOGRAM REPORT: CPT

## 2022-01-06 PROCEDURE — 71045 X-RAY EXAM CHEST 1 VIEW: CPT | Performed by: EMERGENCY MEDICINE

## 2022-01-06 PROCEDURE — 36415 COLL VENOUS BLD VENIPUNCTURE: CPT

## 2022-01-06 RX ORDER — ASPIRIN 81 MG/1
324 TABLET, CHEWABLE ORAL ONCE
Status: COMPLETED | OUTPATIENT
Start: 2022-01-06 | End: 2022-01-06

## 2022-01-07 VITALS
WEIGHT: 178 LBS | OXYGEN SATURATION: 100 % | HEART RATE: 88 BPM | DIASTOLIC BLOOD PRESSURE: 46 MMHG | RESPIRATION RATE: 18 BRPM | SYSTOLIC BLOOD PRESSURE: 106 MMHG | HEIGHT: 62 IN | BODY MASS INDEX: 32.76 KG/M2 | TEMPERATURE: 99 F

## 2022-01-07 LAB
ATRIAL RATE: 97 BPM
P AXIS: 6 DEGREES
P-R INTERVAL: 166 MS
Q-T INTERVAL: 350 MS
QRS DURATION: 76 MS
QTC CALCULATION (BEZET): 444 MS
R AXIS: -3 DEGREES
T AXIS: 34 DEGREES
T4 FREE SERPL-MCNC: 2.3 NG/DL (ref 0.8–1.7)
TROPONIN I HIGH SENSITIVITY: 4 NG/L
TSI SER-ACNC: <0.005 MIU/ML (ref 0.36–3.74)
VENTRICULAR RATE: 97 BPM

## 2022-01-07 NOTE — ED INITIAL ASSESSMENT (HPI)
H/o Grave's disease presents with midsternal chest pain x 2 days, sent by endo for eval, +nausea, + diarrhea

## 2022-01-07 NOTE — ED PROVIDER NOTES
Patient Seen in: THE [facility] Emergency Department In [place]      History   Patient presents with:  Chest Pain Angina    Stated Complaint: Chest pains , high heart rate and left arm hurting, tuesday SX started    Subjective:   HPI    52-year-old female pre 5-24-16    AHI 14 SaO2 selma 68%   • Pneumonia, organism unspecified(486)    • Spinal stenosis    • Thyroid disease    • TIA (transient ischemic attack) 2014              Past Surgical History:   Procedure Laterality Date   • BENIGN BIOPSY LEFT  07/2017 lymphadenopathy. HEART: Regular rate and rhythm, no murmurs. LUNGS: Clear to auscultation bilaterally. No Rales, no rhonchi, no wheezing, no stridor. ABDOMEN: Soft, nondistended,non tender,no rebound, no rigidity, no guarding. no pulsatile masses.  No CV Radiology report was reviewed. Cervical spine x-ray reviewed independently, no fracture or subluxation.   Degenerative changes noted per radiologist.       Differential diagnosis includes coronary syndrome, pulmonary embolism, Covid infection, pneumonia, e [phone]    Call in 1 day            Medications Prescribed:  Discharge Medication List as of 1/7/2022  1:00 AM

## 2022-01-19 PROBLEM — E05.00 GRAVES' DISEASE: Status: ACTIVE | Noted: 2022-01-19

## 2022-06-06 ENCOUNTER — APPOINTMENT (OUTPATIENT)
Dept: GENERAL RADIOLOGY | Age: 52
End: 2022-06-06
Attending: EMERGENCY MEDICINE
Payer: COMMERCIAL

## 2022-06-06 ENCOUNTER — APPOINTMENT (OUTPATIENT)
Dept: CV DIAGNOSTICS | Age: 52
End: 2022-06-06
Attending: EMERGENCY MEDICINE
Payer: COMMERCIAL

## 2022-06-06 ENCOUNTER — HOSPITAL ENCOUNTER (EMERGENCY)
Age: 52
Discharge: HOME | End: 2022-06-06
Attending: EMERGENCY MEDICINE
Payer: COMMERCIAL

## 2022-06-06 VITALS
HEIGHT: 62 IN | BODY MASS INDEX: 33.31 KG/M2 | SYSTOLIC BLOOD PRESSURE: 104 MMHG | HEART RATE: 70 BPM | TEMPERATURE: 97 F | DIASTOLIC BLOOD PRESSURE: 68 MMHG | OXYGEN SATURATION: 99 % | WEIGHT: 181 LBS | RESPIRATION RATE: 18 BRPM

## 2022-06-06 DIAGNOSIS — R07.89 CHEST PAIN, ATYPICAL: Primary | ICD-10-CM

## 2022-06-06 LAB
ALBUMIN SERPL-MCNC: 3.7 G/DL (ref 3.4–5)
ALBUMIN/GLOB SERPL: 1 {RATIO} (ref 1–2)
ALP LIVER SERPL-CCNC: 160 U/L
ALT SERPL-CCNC: 55 U/L
ANION GAP SERPL CALC-SCNC: 6 MMOL/L (ref 0–18)
AST SERPL-CCNC: 30 U/L (ref 15–37)
ATRIAL RATE: 78 BPM
BASOPHILS # BLD AUTO: 0.07 X10(3) UL (ref 0–0.2)
BASOPHILS NFR BLD AUTO: 0.9 %
BILIRUB SERPL-MCNC: 0.7 MG/DL (ref 0.1–2)
BUN BLD-MCNC: 17 MG/DL (ref 7–18)
CALCIUM BLD-MCNC: 8.6 MG/DL (ref 8.5–10.1)
CHLORIDE SERPL-SCNC: 108 MMOL/L (ref 98–112)
CO2 SERPL-SCNC: 26 MMOL/L (ref 21–32)
CREAT BLD-MCNC: 0.73 MG/DL
D DIMER PPP FEU-MCNC: 0.27 UG/ML FEU (ref ?–0.51)
EOSINOPHIL # BLD AUTO: 0.15 X10(3) UL (ref 0–0.7)
EOSINOPHIL NFR BLD AUTO: 2 %
ERYTHROCYTE [DISTWIDTH] IN BLOOD BY AUTOMATED COUNT: 14.6 %
GLOBULIN PLAS-MCNC: 3.7 G/DL (ref 2.8–4.4)
GLUCOSE BLD-MCNC: 106 MG/DL (ref 70–99)
HCT VFR BLD AUTO: 39.7 %
HGB BLD-MCNC: 13.2 G/DL
IMM GRANULOCYTES # BLD AUTO: 0.03 X10(3) UL (ref 0–1)
IMM GRANULOCYTES NFR BLD: 0.4 %
LYMPHOCYTES # BLD AUTO: 3.41 X10(3) UL (ref 1–4)
LYMPHOCYTES NFR BLD AUTO: 45.5 %
MCH RBC QN AUTO: 27.6 PG (ref 26–34)
MCHC RBC AUTO-ENTMCNC: 33.2 G/DL (ref 31–37)
MCV RBC AUTO: 82.9 FL
MONOCYTES # BLD AUTO: 0.52 X10(3) UL (ref 0.1–1)
MONOCYTES NFR BLD AUTO: 6.9 %
NEUTROPHILS # BLD AUTO: 3.31 X10 (3) UL (ref 1.5–7.7)
NEUTROPHILS # BLD AUTO: 3.31 X10(3) UL (ref 1.5–7.7)
NEUTROPHILS NFR BLD AUTO: 44.3 %
OSMOLALITY SERPL CALC.SUM OF ELEC: 292 MOSM/KG (ref 275–295)
P AXIS: 37 DEGREES
P-R INTERVAL: 156 MS
PLATELET # BLD AUTO: 246 10(3)UL (ref 150–450)
POTASSIUM SERPL-SCNC: 3.6 MMOL/L (ref 3.5–5.1)
PROT SERPL-MCNC: 7.4 G/DL (ref 6.4–8.2)
Q-T INTERVAL: 382 MS
QRS DURATION: 80 MS
QTC CALCULATION (BEZET): 435 MS
R AXIS: -20 DEGREES
RBC # BLD AUTO: 4.79 X10(6)UL
SARS-COV-2 RNA RESP QL NAA+PROBE: NOT DETECTED
SODIUM SERPL-SCNC: 140 MMOL/L (ref 136–145)
T AXIS: 43 DEGREES
TROPONIN I HIGH SENSITIVITY: 4 NG/L
VENTRICULAR RATE: 78 BPM
WBC # BLD AUTO: 7.5 X10(3) UL (ref 4–11)

## 2022-06-06 PROCEDURE — 96374 THER/PROPH/DIAG INJ IV PUSH: CPT

## 2022-06-06 PROCEDURE — 85025 COMPLETE CBC W/AUTO DIFF WBC: CPT | Performed by: EMERGENCY MEDICINE

## 2022-06-06 PROCEDURE — 93350 STRESS TTE ONLY: CPT | Performed by: EMERGENCY MEDICINE

## 2022-06-06 PROCEDURE — 99285 EMERGENCY DEPT VISIT HI MDM: CPT

## 2022-06-06 PROCEDURE — 93017 CV STRESS TEST TRACING ONLY: CPT | Performed by: EMERGENCY MEDICINE

## 2022-06-06 PROCEDURE — 93005 ELECTROCARDIOGRAM TRACING: CPT

## 2022-06-06 PROCEDURE — 85379 FIBRIN DEGRADATION QUANT: CPT | Performed by: EMERGENCY MEDICINE

## 2022-06-06 PROCEDURE — 80053 COMPREHEN METABOLIC PANEL: CPT | Performed by: EMERGENCY MEDICINE

## 2022-06-06 PROCEDURE — 93010 ELECTROCARDIOGRAM REPORT: CPT

## 2022-06-06 PROCEDURE — 93018 CV STRESS TEST I&R ONLY: CPT | Performed by: EMERGENCY MEDICINE

## 2022-06-06 PROCEDURE — 84484 ASSAY OF TROPONIN QUANT: CPT | Performed by: EMERGENCY MEDICINE

## 2022-06-06 PROCEDURE — 71045 X-RAY EXAM CHEST 1 VIEW: CPT | Performed by: EMERGENCY MEDICINE

## 2022-06-06 RX ORDER — ASPIRIN 81 MG/1
162 TABLET, CHEWABLE ORAL DAILY
Status: DISCONTINUED | OUTPATIENT
Start: 2022-06-06 | End: 2022-06-06

## 2022-06-06 RX ORDER — KETOROLAC TROMETHAMINE 15 MG/ML
15 INJECTION, SOLUTION INTRAMUSCULAR; INTRAVENOUS ONCE
Status: COMPLETED | OUTPATIENT
Start: 2022-06-06 | End: 2022-06-06

## 2022-06-06 NOTE — ED INITIAL ASSESSMENT (HPI)
C/o chest pain like \"electricity\" across her chest alex to left arm. +sob. On augmentin for Bronchitis. Neg covid on Tuesday.

## 2022-06-06 NOTE — IMAGING NOTE
Cardiac Diagnostics:    Stat stress echo completed. Pt had chest pain 4/10 at rest that increased to 6/10 at peak exercise and stated she needed to stop. Post imaging obtained. Chest pain decreased back to 5/10. Returned pt via W/C back to ED. [facility] informed of completion.

## 2022-07-22 ENCOUNTER — APPOINTMENT (OUTPATIENT)
Dept: GENERAL RADIOLOGY | Age: 52
End: 2022-07-22
Attending: EMERGENCY MEDICINE

## 2022-07-22 ENCOUNTER — APPOINTMENT (OUTPATIENT)
Dept: ULTRASOUND IMAGING | Age: 52
End: 2022-07-22
Attending: EMERGENCY MEDICINE

## 2022-07-22 ENCOUNTER — HOSPITAL ENCOUNTER (EMERGENCY)
Age: 52
Discharge: HOME | End: 2022-07-22
Attending: EMERGENCY MEDICINE

## 2022-07-22 VITALS
HEIGHT: 62 IN | OXYGEN SATURATION: 98 % | SYSTOLIC BLOOD PRESSURE: 106 MMHG | BODY MASS INDEX: 35.15 KG/M2 | HEART RATE: 64 BPM | RESPIRATION RATE: 18 BRPM | TEMPERATURE: 98 F | DIASTOLIC BLOOD PRESSURE: 70 MMHG | WEIGHT: 191 LBS

## 2022-07-22 DIAGNOSIS — S86.912A KNEE STRAIN, LEFT, INITIAL ENCOUNTER: Primary | ICD-10-CM

## 2022-07-22 PROCEDURE — 93971 EXTREMITY STUDY: CPT | Performed by: EMERGENCY MEDICINE

## 2022-07-22 PROCEDURE — 99284 EMERGENCY DEPT VISIT MOD MDM: CPT

## 2022-07-22 PROCEDURE — 73590 X-RAY EXAM OF LOWER LEG: CPT | Performed by: EMERGENCY MEDICINE

## 2022-07-22 PROCEDURE — 73560 X-RAY EXAM OF KNEE 1 OR 2: CPT | Performed by: EMERGENCY MEDICINE

## 2022-07-23 NOTE — ED INITIAL ASSESSMENT (HPI)
Left leg has felt \"heavy\" for the past 3 days. Pt reports hx of sciatica. No recent trauma or falls. Pt walks to ER desk.

## 2022-12-07 ENCOUNTER — LAB REQUISITION (OUTPATIENT)
Age: 52
End: 2022-12-07
Payer: COMMERCIAL

## 2022-12-07 DIAGNOSIS — Z12.11 COLON CANCER SCREENING: ICD-10-CM

## 2022-12-07 PROCEDURE — 88305 TISSUE EXAM BY PATHOLOGIST: CPT | Performed by: INTERNAL MEDICINE

## 2023-01-02 ENCOUNTER — HOSPITAL ENCOUNTER (EMERGENCY)
Age: 53
Discharge: HOME | End: 2023-01-02
Attending: EMERGENCY MEDICINE
Payer: COMMERCIAL

## 2023-01-02 ENCOUNTER — APPOINTMENT (OUTPATIENT)
Dept: GENERAL RADIOLOGY | Age: 53
End: 2023-01-02
Payer: COMMERCIAL

## 2023-01-02 VITALS
WEIGHT: 199 LBS | HEART RATE: 74 BPM | HEIGHT: 62 IN | OXYGEN SATURATION: 98 % | BODY MASS INDEX: 36.62 KG/M2 | DIASTOLIC BLOOD PRESSURE: 77 MMHG | RESPIRATION RATE: 18 BRPM | TEMPERATURE: 97 F | SYSTOLIC BLOOD PRESSURE: 132 MMHG

## 2023-01-02 DIAGNOSIS — J40 SINOBRONCHITIS: Primary | ICD-10-CM

## 2023-01-02 DIAGNOSIS — J32.9 SINOBRONCHITIS: Primary | ICD-10-CM

## 2023-01-02 PROCEDURE — 99284 EMERGENCY DEPT VISIT MOD MDM: CPT

## 2023-01-02 PROCEDURE — 71046 X-RAY EXAM CHEST 2 VIEWS: CPT | Performed by: EMERGENCY MEDICINE

## 2023-01-02 PROCEDURE — 99283 EMERGENCY DEPT VISIT LOW MDM: CPT

## 2023-01-02 RX ORDER — TIRZEPATIDE 5 MG/.5ML
INJECTION, SOLUTION SUBCUTANEOUS
COMMUNITY

## 2023-01-02 RX ORDER — DOXYCYCLINE HYCLATE 100 MG/1
100 CAPSULE ORAL 2 TIMES DAILY
Qty: 10 CAPSULE | Refills: 0 | Status: SHIPPED | OUTPATIENT
Start: 2023-01-02 | End: 2023-01-07

## 2023-01-02 RX ORDER — PREDNISONE 20 MG/1
40 TABLET ORAL DAILY
Qty: 10 TABLET | Refills: 0 | Status: SHIPPED | OUTPATIENT
Start: 2023-01-02 | End: 2023-01-07

## 2023-01-03 NOTE — ED INITIAL ASSESSMENT (HPI)
Pt states she was dx with influenza A 2 wks ago and continues to have productive cough, congestion and fever.

## 2023-01-31 RX ORDER — LEVOCETIRIZINE DIHYDROCHLORIDE 5 MG/1
5 TABLET, FILM COATED ORAL EVERY EVENING
COMMUNITY
Start: 2023-01-16

## 2023-01-31 RX ORDER — LEVOTHYROXINE SODIUM 137 UG/1
137 TABLET ORAL
COMMUNITY
Start: 2023-01-12

## 2023-01-31 RX ORDER — SCOLOPAMINE TRANSDERMAL SYSTEM 1 MG/1
1 PATCH, EXTENDED RELEASE TRANSDERMAL ONCE
Status: CANCELLED | OUTPATIENT
Start: 2023-01-31 | End: 2023-01-31

## 2023-01-31 RX ORDER — AZELASTINE 1 MG/ML
2 SPRAY, METERED NASAL 2 TIMES DAILY
COMMUNITY

## 2023-02-21 ENCOUNTER — HOSPITAL ENCOUNTER (EMERGENCY)
Age: 53
Discharge: HOME | End: 2023-02-21
Attending: EMERGENCY MEDICINE
Payer: COMMERCIAL

## 2023-02-21 ENCOUNTER — APPOINTMENT (OUTPATIENT)
Dept: GENERAL RADIOLOGY | Age: 53
End: 2023-02-21
Attending: PHYSICIAN ASSISTANT
Payer: COMMERCIAL

## 2023-02-21 VITALS
HEIGHT: 62 IN | TEMPERATURE: 97 F | DIASTOLIC BLOOD PRESSURE: 72 MMHG | WEIGHT: 199 LBS | HEART RATE: 71 BPM | BODY MASS INDEX: 36.62 KG/M2 | OXYGEN SATURATION: 96 % | SYSTOLIC BLOOD PRESSURE: 113 MMHG | RESPIRATION RATE: 16 BRPM

## 2023-02-21 DIAGNOSIS — M54.16 LEFT LUMBAR RADICULOPATHY: Primary | ICD-10-CM

## 2023-02-21 DIAGNOSIS — S30.0XXA LUMBAR CONTUSION, INITIAL ENCOUNTER: ICD-10-CM

## 2023-02-21 DIAGNOSIS — M43.16 SPONDYLOLISTHESIS OF LUMBAR REGION: ICD-10-CM

## 2023-02-21 PROCEDURE — 72110 X-RAY EXAM L-2 SPINE 4/>VWS: CPT | Performed by: PHYSICIAN ASSISTANT

## 2023-02-21 PROCEDURE — 99284 EMERGENCY DEPT VISIT MOD MDM: CPT | Performed by: EMERGENCY MEDICINE

## 2023-02-21 RX ORDER — DEXAMETHASONE 4 MG/1
10 TABLET ORAL ONCE
Status: COMPLETED | OUTPATIENT
Start: 2023-02-21 | End: 2023-02-21

## 2023-02-21 RX ORDER — CYCLOBENZAPRINE HCL 10 MG
10 TABLET ORAL NIGHTLY PRN
Qty: 10 TABLET | Refills: 0 | Status: SHIPPED | OUTPATIENT
Start: 2023-02-21 | End: 2023-03-03

## 2023-02-21 NOTE — DISCHARGE INSTRUCTIONS
You can continue taking your meloxicam daily. You were given a dose of Decadron here which is a steroid this will last in your system over the next 72 hours take the Flexeril at bedtime as needed for muscle spasm. Do not operate machinery or drive or consume alcohol while taking this medication. You may use ice or moist heat 10-15 minutes several times a day for comfort. Ice through clothing or a towel to avoid frostbite. Do not sleep with a heating pad as this will make the spasm worse. Change positions frequently throughout the day. Avoid heavy lifting. Once your pain has improved, start stretching exercises. Go to the closest Emergency Department if you develop uncontrolled pain, spontaneous loss of urine or stool, numbness or weakness into your arms or legs. See your doctor in 3-5 days if not better.

## 2023-07-03 ENCOUNTER — APPOINTMENT (OUTPATIENT)
Dept: ULTRASOUND IMAGING | Age: 53
End: 2023-07-03
Attending: EMERGENCY MEDICINE
Payer: COMMERCIAL

## 2023-07-03 ENCOUNTER — HOSPITAL ENCOUNTER (EMERGENCY)
Age: 53
Discharge: HOME | End: 2023-07-03
Attending: EMERGENCY MEDICINE
Payer: COMMERCIAL

## 2023-07-03 VITALS
WEIGHT: 197 LBS | TEMPERATURE: 98 F | RESPIRATION RATE: 16 BRPM | SYSTOLIC BLOOD PRESSURE: 102 MMHG | BODY MASS INDEX: 36 KG/M2 | DIASTOLIC BLOOD PRESSURE: 43 MMHG | HEART RATE: 62 BPM | OXYGEN SATURATION: 98 %

## 2023-07-03 DIAGNOSIS — M79.662 PAIN OF LEFT CALF: Primary | ICD-10-CM

## 2023-07-03 PROCEDURE — 93971 EXTREMITY STUDY: CPT | Performed by: EMERGENCY MEDICINE

## 2023-07-03 PROCEDURE — 99284 EMERGENCY DEPT VISIT MOD MDM: CPT

## 2023-07-03 RX ORDER — IBUPROFEN 600 MG/1
600 TABLET ORAL ONCE
Status: COMPLETED | OUTPATIENT
Start: 2023-07-03 | End: 2023-07-03

## 2023-07-03 NOTE — DISCHARGE INSTRUCTIONS
Please follow-up with your primary care physician 1-2 days return to the ER if your symptoms worsen progress or if you have any further concerns. Please take Motrin 600 mg every 6 hours as needed for pain control. Rest ice elevate the leg to help with pain control. If your symptoms continue to progress or if you develop swelling or redness in the calf return for repeat ultrasound or further evaluation.

## 2023-07-03 NOTE — ED INITIAL ASSESSMENT (HPI)
Patient arrives from home with c/o right calf numb/tingling sensation states started 2 weeks ago with cramping sensation

## 2023-08-03 ENCOUNTER — HOSPITAL ENCOUNTER (EMERGENCY)
Age: 53
Discharge: HOME | End: 2023-08-03
Attending: EMERGENCY MEDICINE
Payer: COMMERCIAL

## 2023-08-03 ENCOUNTER — APPOINTMENT (OUTPATIENT)
Dept: GENERAL RADIOLOGY | Age: 53
End: 2023-08-03
Attending: EMERGENCY MEDICINE
Payer: COMMERCIAL

## 2023-08-03 VITALS
RESPIRATION RATE: 16 BRPM | OXYGEN SATURATION: 97 % | WEIGHT: 196.19 LBS | DIASTOLIC BLOOD PRESSURE: 73 MMHG | HEART RATE: 68 BPM | SYSTOLIC BLOOD PRESSURE: 117 MMHG | TEMPERATURE: 98 F | BODY MASS INDEX: 36 KG/M2

## 2023-08-03 DIAGNOSIS — R07.9 CHEST PAIN OF UNCERTAIN ETIOLOGY: Primary | ICD-10-CM

## 2023-08-03 LAB
ALBUMIN SERPL-MCNC: 3.7 G/DL (ref 3.4–5)
ALBUMIN/GLOB SERPL: 1.1 {RATIO} (ref 1–2)
ALP LIVER SERPL-CCNC: 94 U/L
ALT SERPL-CCNC: 28 U/L
ANION GAP SERPL CALC-SCNC: 3 MMOL/L (ref 0–18)
AST SERPL-CCNC: 17 U/L (ref 15–37)
BASOPHILS # BLD AUTO: 0.06 X10(3) UL (ref 0–0.2)
BASOPHILS NFR BLD AUTO: 0.6 %
BILIRUB SERPL-MCNC: 0.8 MG/DL (ref 0.1–2)
BUN BLD-MCNC: 16 MG/DL (ref 7–18)
CALCIUM BLD-MCNC: 8.7 MG/DL (ref 8.5–10.1)
CHLORIDE SERPL-SCNC: 101 MMOL/L (ref 98–112)
CO2 SERPL-SCNC: 27 MMOL/L (ref 21–32)
CREAT BLD-MCNC: 0.72 MG/DL
EGFRCR SERPLBLD CKD-EPI 2021: 101 ML/MIN/1.73M2 (ref 60–?)
EOSINOPHIL # BLD AUTO: 0.03 X10(3) UL (ref 0–0.7)
EOSINOPHIL NFR BLD AUTO: 0.3 %
ERYTHROCYTE [DISTWIDTH] IN BLOOD BY AUTOMATED COUNT: 13.6 %
GLOBULIN PLAS-MCNC: 3.5 G/DL (ref 2.8–4.4)
GLUCOSE BLD-MCNC: 160 MG/DL (ref 70–99)
HCT VFR BLD AUTO: 38.6 %
HGB BLD-MCNC: 13 G/DL
IMM GRANULOCYTES # BLD AUTO: 0.07 X10(3) UL (ref 0–1)
IMM GRANULOCYTES NFR BLD: 0.8 %
LIPASE SERPL-CCNC: 34 U/L (ref 13–75)
LYMPHOCYTES # BLD AUTO: 2.88 X10(3) UL (ref 1–4)
LYMPHOCYTES NFR BLD AUTO: 31.1 %
MCH RBC QN AUTO: 28.7 PG (ref 26–34)
MCHC RBC AUTO-ENTMCNC: 33.7 G/DL (ref 31–37)
MCV RBC AUTO: 85.2 FL
MONOCYTES # BLD AUTO: 0.58 X10(3) UL (ref 0.1–1)
MONOCYTES NFR BLD AUTO: 6.3 %
NEUTROPHILS # BLD AUTO: 5.63 X10 (3) UL (ref 1.5–7.7)
NEUTROPHILS # BLD AUTO: 5.63 X10(3) UL (ref 1.5–7.7)
NEUTROPHILS NFR BLD AUTO: 60.9 %
OSMOLALITY SERPL CALC.SUM OF ELEC: 277 MOSM/KG (ref 275–295)
PLATELET # BLD AUTO: 220 10(3)UL (ref 150–450)
POTASSIUM SERPL-SCNC: 3.8 MMOL/L (ref 3.5–5.1)
PROT SERPL-MCNC: 7.2 G/DL (ref 6.4–8.2)
RBC # BLD AUTO: 4.53 X10(6)UL
SODIUM SERPL-SCNC: 131 MMOL/L (ref 136–145)
TROPONIN I HIGH SENSITIVITY: <3 NG/L
WBC # BLD AUTO: 9.3 X10(3) UL (ref 4–11)

## 2023-08-03 PROCEDURE — 36415 COLL VENOUS BLD VENIPUNCTURE: CPT

## 2023-08-03 PROCEDURE — 80053 COMPREHEN METABOLIC PANEL: CPT | Performed by: EMERGENCY MEDICINE

## 2023-08-03 PROCEDURE — 99284 EMERGENCY DEPT VISIT MOD MDM: CPT

## 2023-08-03 PROCEDURE — 85025 COMPLETE CBC W/AUTO DIFF WBC: CPT | Performed by: EMERGENCY MEDICINE

## 2023-08-03 PROCEDURE — 99285 EMERGENCY DEPT VISIT HI MDM: CPT

## 2023-08-03 PROCEDURE — 83690 ASSAY OF LIPASE: CPT | Performed by: EMERGENCY MEDICINE

## 2023-08-03 PROCEDURE — 93005 ELECTROCARDIOGRAM TRACING: CPT

## 2023-08-03 PROCEDURE — 93010 ELECTROCARDIOGRAM REPORT: CPT

## 2023-08-03 PROCEDURE — 84484 ASSAY OF TROPONIN QUANT: CPT | Performed by: EMERGENCY MEDICINE

## 2023-08-03 PROCEDURE — 71045 X-RAY EXAM CHEST 1 VIEW: CPT | Performed by: EMERGENCY MEDICINE

## 2023-08-03 NOTE — ED INITIAL ASSESSMENT (HPI)
C/o upper chest ache radiating to her jaw since yesterday morning constant. C/o headache since Tuesday and \"feeling off\".

## 2023-08-04 LAB
ATRIAL RATE: 70 BPM
P AXIS: 35 DEGREES
P-R INTERVAL: 162 MS
Q-T INTERVAL: 392 MS
QRS DURATION: 80 MS
QTC CALCULATION (BEZET): 423 MS
R AXIS: -14 DEGREES
T AXIS: 28 DEGREES
VENTRICULAR RATE: 70 BPM

## 2023-08-23 ENCOUNTER — ANESTHESIA EVENT (OUTPATIENT)
Dept: SURGERY | Facility: HOSPITAL | Age: 53
End: 2023-08-23
Payer: COMMERCIAL

## 2023-09-08 NOTE — PAT NURSING NOTE
Telephoned Dr. Nino Mesa office and spoke with the call center who tried to send my call to the PCP's office to obtain medical clearance note. She states that no one picked up at the office so she will send high priority message to the nurses to follow up regarding  the clearance note.

## 2023-09-13 ENCOUNTER — APPOINTMENT (OUTPATIENT)
Dept: GENERAL RADIOLOGY | Facility: HOSPITAL | Age: 53
End: 2023-09-13
Attending: ORTHOPAEDIC SURGERY
Payer: COMMERCIAL

## 2023-09-13 ENCOUNTER — HOSPITAL ENCOUNTER (INPATIENT)
Facility: HOSPITAL | Age: 53
LOS: 1 days | Discharge: HOME | End: 2023-09-14
Attending: ORTHOPAEDIC SURGERY | Admitting: ORTHOPAEDIC SURGERY
Payer: COMMERCIAL

## 2023-09-13 ENCOUNTER — ANESTHESIA (OUTPATIENT)
Dept: SURGERY | Facility: HOSPITAL | Age: 53
End: 2023-09-13
Payer: COMMERCIAL

## 2023-09-13 DIAGNOSIS — Z01.812 ENCOUNTER FOR PREPROCEDURE SCREENING LABORATORY TESTING FOR COVID-19: Primary | ICD-10-CM

## 2023-09-13 DIAGNOSIS — Z20.822 ENCOUNTER FOR PREPROCEDURE SCREENING LABORATORY TESTING FOR COVID-19: Primary | ICD-10-CM

## 2023-09-13 PROBLEM — Z11.52 ENCOUNTER FOR PREPROCEDURE SCREENING LABORATORY TESTING FOR COVID-19: Status: ACTIVE | Noted: 2023-09-13

## 2023-09-13 LAB
B-HCG UR QL: NEGATIVE
B-HCG UR QL: NEGATIVE
GLUCOSE BLD-MCNC: 118 MG/DL (ref 70–99)
GLUCOSE BLD-MCNC: 147 MG/DL (ref 70–99)
GLUCOSE BLD-MCNC: 161 MG/DL (ref 70–99)
GLUCOSE BLD-MCNC: 179 MG/DL (ref 70–99)

## 2023-09-13 PROCEDURE — 0SB20ZZ EXCISION OF LUMBAR VERTEBRAL DISC, OPEN APPROACH: ICD-10-PCS | Performed by: ORTHOPAEDIC SURGERY

## 2023-09-13 PROCEDURE — 81025 URINE PREGNANCY TEST: CPT

## 2023-09-13 PROCEDURE — 0SG00AJ FUSION OF LUMBAR VERTEBRAL JOINT WITH INTERBODY FUSION DEVICE, POSTERIOR APPROACH, ANTERIOR COLUMN, OPEN APPROACH: ICD-10-PCS | Performed by: ORTHOPAEDIC SURGERY

## 2023-09-13 PROCEDURE — 76000 FLUOROSCOPY <1 HR PHYS/QHP: CPT | Performed by: ORTHOPAEDIC SURGERY

## 2023-09-13 PROCEDURE — 82962 GLUCOSE BLOOD TEST: CPT

## 2023-09-13 PROCEDURE — 4A11X4G MONITORING OF PERIPHERAL NERVOUS ELECTRICAL ACTIVITY, INTRAOPERATIVE, EXTERNAL APPROACH: ICD-10-PCS | Performed by: ORTHOPAEDIC SURGERY

## 2023-09-13 PROCEDURE — 01NB0ZZ RELEASE LUMBAR NERVE, OPEN APPROACH: ICD-10-PCS | Performed by: ORTHOPAEDIC SURGERY

## 2023-09-13 DEVICE — OSTEOCEL PRO LARGE BULK BUY: Type: IMPLANTABLE DEVICE | Site: BACK | Status: FUNCTIONAL

## 2023-09-13 DEVICE — RELINE MAS MOD REDUCTION EXT: Type: IMPLANTABLE DEVICE | Site: BACK | Status: FUNCTIONAL

## 2023-09-13 DEVICE — COHERE TLIF-O, 14X10X30MM 12°
Type: IMPLANTABLE DEVICE | Site: BACK | Status: FUNCTIONAL
Brand: COHERE

## 2023-09-13 DEVICE — BONE GRAFT KIT 7510200 INFUSE SMALL
Type: IMPLANTABLE DEVICE | Site: BACK | Status: FUNCTIONAL
Brand: INFUSE® BONE GRAFT

## 2023-09-13 DEVICE — RELINE MAS TI ROD 5.5X35 LRDTC: Type: IMPLANTABLE DEVICE | Site: BACK | Status: FUNCTIONAL

## 2023-09-13 DEVICE — RELINE MAS RED SCREW 6.5X45 2C: Type: IMPLANTABLE DEVICE | Site: BACK | Status: FUNCTIONAL

## 2023-09-13 DEVICE — RELINE MAS MOD SCREW 6.5X45 2C: Type: IMPLANTABLE DEVICE | Site: BACK | Status: FUNCTIONAL

## 2023-09-13 DEVICE — RELINE LCK SCRW 5.5 OPEN TULIP: Type: IMPLANTABLE DEVICE | Site: BACK | Status: FUNCTIONAL

## 2023-09-13 DEVICE — ATTRAX® SCAFFOLD STRIPS, SMALL
Type: IMPLANTABLE DEVICE | Site: BACK | Status: FUNCTIONAL
Brand: ATTRAX

## 2023-09-13 RX ORDER — SODIUM CHLORIDE, SODIUM LACTATE, POTASSIUM CHLORIDE, CALCIUM CHLORIDE 600; 310; 30; 20 MG/100ML; MG/100ML; MG/100ML; MG/100ML
INJECTION, SOLUTION INTRAVENOUS CONTINUOUS
Status: DISCONTINUED | OUTPATIENT
Start: 2023-09-13 | End: 2023-09-14

## 2023-09-13 RX ORDER — MIDAZOLAM HYDROCHLORIDE 1 MG/ML
1 INJECTION INTRAMUSCULAR; INTRAVENOUS EVERY 5 MIN PRN
Status: DISCONTINUED | OUTPATIENT
Start: 2023-09-13 | End: 2023-09-13 | Stop reason: HOSPADM

## 2023-09-13 RX ORDER — NICOTINE POLACRILEX 4 MG
15 LOZENGE BUCCAL
Status: DISCONTINUED | OUTPATIENT
Start: 2023-09-13 | End: 2023-09-13 | Stop reason: HOSPADM

## 2023-09-13 RX ORDER — ATORVASTATIN CALCIUM 10 MG/1
10 TABLET, FILM COATED ORAL NIGHTLY
Status: DISCONTINUED | OUTPATIENT
Start: 2023-09-13 | End: 2023-09-14

## 2023-09-13 RX ORDER — BISACODYL 10 MG
10 SUPPOSITORY, RECTAL RECTAL
Status: DISCONTINUED | OUTPATIENT
Start: 2023-09-13 | End: 2023-09-14

## 2023-09-13 RX ORDER — ONDANSETRON 2 MG/ML
4 INJECTION INTRAMUSCULAR; INTRAVENOUS ONCE
Status: COMPLETED | OUTPATIENT
Start: 2023-09-13 | End: 2023-09-13

## 2023-09-13 RX ORDER — HYDROMORPHONE HYDROCHLORIDE 1 MG/ML
INJECTION, SOLUTION INTRAMUSCULAR; INTRAVENOUS; SUBCUTANEOUS AS NEEDED
Status: DISCONTINUED | OUTPATIENT
Start: 2023-09-13 | End: 2023-09-13 | Stop reason: SURG

## 2023-09-13 RX ORDER — HYDROCODONE BITARTRATE AND ACETAMINOPHEN 10; 325 MG/1; MG/1
1 TABLET ORAL EVERY 6 HOURS PRN
COMMUNITY
Start: 2023-09-06

## 2023-09-13 RX ORDER — ONDANSETRON 2 MG/ML
4 INJECTION INTRAMUSCULAR; INTRAVENOUS EVERY 6 HOURS PRN
Status: DISCONTINUED | OUTPATIENT
Start: 2023-09-13 | End: 2023-09-14

## 2023-09-13 RX ORDER — DOCUSATE SODIUM 100 MG/1
100 CAPSULE, LIQUID FILLED ORAL 2 TIMES DAILY
Status: DISCONTINUED | OUTPATIENT
Start: 2023-09-13 | End: 2023-09-14

## 2023-09-13 RX ORDER — DIPHENHYDRAMINE HYDROCHLORIDE 50 MG/ML
12.5 INJECTION INTRAMUSCULAR; INTRAVENOUS AS NEEDED
Status: DISCONTINUED | OUTPATIENT
Start: 2023-09-13 | End: 2023-09-13 | Stop reason: HOSPADM

## 2023-09-13 RX ORDER — PHENYLEPHRINE HCL 10 MG/ML
VIAL (ML) INJECTION AS NEEDED
Status: DISCONTINUED | OUTPATIENT
Start: 2023-09-13 | End: 2023-09-13 | Stop reason: SURG

## 2023-09-13 RX ORDER — NICOTINE POLACRILEX 4 MG
15 LOZENGE BUCCAL
Status: DISCONTINUED | OUTPATIENT
Start: 2023-09-13 | End: 2023-09-14

## 2023-09-13 RX ORDER — POLYETHYLENE GLYCOL 3350 17 G/17G
17 POWDER, FOR SOLUTION ORAL DAILY PRN
Status: DISCONTINUED | OUTPATIENT
Start: 2023-09-13 | End: 2023-09-14

## 2023-09-13 RX ORDER — NICOTINE POLACRILEX 4 MG
30 LOZENGE BUCCAL
Status: DISCONTINUED | OUTPATIENT
Start: 2023-09-13 | End: 2023-09-13 | Stop reason: HOSPADM

## 2023-09-13 RX ORDER — DEXTROSE MONOHYDRATE 25 G/50ML
50 INJECTION, SOLUTION INTRAVENOUS
Status: DISCONTINUED | OUTPATIENT
Start: 2023-09-13 | End: 2023-09-13 | Stop reason: HOSPADM

## 2023-09-13 RX ORDER — DIPHENHYDRAMINE HYDROCHLORIDE 50 MG/ML
25 INJECTION INTRAMUSCULAR; INTRAVENOUS EVERY 4 HOURS PRN
Status: DISCONTINUED | OUTPATIENT
Start: 2023-09-13 | End: 2023-09-14

## 2023-09-13 RX ORDER — TRANEXAMIC ACID 10 MG/ML
INJECTION, SOLUTION INTRAVENOUS AS NEEDED
Status: DISCONTINUED | OUTPATIENT
Start: 2023-09-13 | End: 2023-09-13 | Stop reason: SURG

## 2023-09-13 RX ORDER — ALBUTEROL SULFATE 90 UG/1
2 AEROSOL, METERED RESPIRATORY (INHALATION) EVERY 4 HOURS PRN
Status: DISCONTINUED | OUTPATIENT
Start: 2023-09-13 | End: 2023-09-14

## 2023-09-13 RX ORDER — ONDANSETRON 2 MG/ML
INJECTION INTRAMUSCULAR; INTRAVENOUS AS NEEDED
Status: DISCONTINUED | OUTPATIENT
Start: 2023-09-13 | End: 2023-09-13 | Stop reason: SURG

## 2023-09-13 RX ORDER — HYDROCODONE BITARTRATE AND ACETAMINOPHEN 5; 325 MG/1; MG/1
2 TABLET ORAL ONCE AS NEEDED
Status: DISCONTINUED | OUTPATIENT
Start: 2023-09-13 | End: 2023-09-13 | Stop reason: HOSPADM

## 2023-09-13 RX ORDER — DEXTROSE MONOHYDRATE 25 G/50ML
50 INJECTION, SOLUTION INTRAVENOUS
Status: DISCONTINUED | OUTPATIENT
Start: 2023-09-13 | End: 2023-09-14

## 2023-09-13 RX ORDER — MONTELUKAST SODIUM 10 MG/1
10 TABLET ORAL DAILY
COMMUNITY
Start: 2023-07-23

## 2023-09-13 RX ORDER — METHOCARBAMOL 750 MG/1
750 TABLET, FILM COATED ORAL EVERY 6 HOURS PRN
Status: DISCONTINUED | OUTPATIENT
Start: 2023-09-13 | End: 2023-09-14

## 2023-09-13 RX ORDER — OXYCODONE HYDROCHLORIDE 5 MG/1
10 TABLET ORAL EVERY 4 HOURS PRN
Status: DISCONTINUED | OUTPATIENT
Start: 2023-09-13 | End: 2023-09-14

## 2023-09-13 RX ORDER — MIDAZOLAM HYDROCHLORIDE 1 MG/ML
INJECTION INTRAMUSCULAR; INTRAVENOUS AS NEEDED
Status: DISCONTINUED | OUTPATIENT
Start: 2023-09-13 | End: 2023-09-13 | Stop reason: SURG

## 2023-09-13 RX ORDER — MONTELUKAST SODIUM 10 MG/1
10 TABLET ORAL NIGHTLY
Status: DISCONTINUED | OUTPATIENT
Start: 2023-09-14 | End: 2023-09-14

## 2023-09-13 RX ORDER — PROCHLORPERAZINE EDISYLATE 5 MG/ML
5 INJECTION INTRAMUSCULAR; INTRAVENOUS EVERY 8 HOURS PRN
Status: DISCONTINUED | OUTPATIENT
Start: 2023-09-13 | End: 2023-09-14

## 2023-09-13 RX ORDER — LIDOCAINE HYDROCHLORIDE 10 MG/ML
INJECTION, SOLUTION EPIDURAL; INFILTRATION; INTRACAUDAL; PERINEURAL AS NEEDED
Status: DISCONTINUED | OUTPATIENT
Start: 2023-09-13 | End: 2023-09-13 | Stop reason: SURG

## 2023-09-13 RX ORDER — DIPHENHYDRAMINE HCL 25 MG
25 CAPSULE ORAL EVERY 4 HOURS PRN
Status: DISCONTINUED | OUTPATIENT
Start: 2023-09-13 | End: 2023-09-14

## 2023-09-13 RX ORDER — ONDANSETRON 2 MG/ML
4 INJECTION INTRAMUSCULAR; INTRAVENOUS EVERY 6 HOURS PRN
Status: DISCONTINUED | OUTPATIENT
Start: 2023-09-13 | End: 2023-09-13 | Stop reason: HOSPADM

## 2023-09-13 RX ORDER — CELECOXIB 200 MG/1
200 CAPSULE ORAL ONCE
Status: COMPLETED | OUTPATIENT
Start: 2023-09-13 | End: 2023-09-13

## 2023-09-13 RX ORDER — FLUTICASONE FUROATE AND VILANTEROL 200; 25 UG/1; UG/1
1 POWDER RESPIRATORY (INHALATION) DAILY
Status: DISCONTINUED | OUTPATIENT
Start: 2023-09-14 | End: 2023-09-14

## 2023-09-13 RX ORDER — METHOCARBAMOL 500 MG/1
1 TABLET, FILM COATED ORAL 3 TIMES DAILY
COMMUNITY
Start: 2023-09-06

## 2023-09-13 RX ORDER — CEFAZOLIN SODIUM/WATER 2 G/20 ML
2 SYRINGE (ML) INTRAVENOUS ONCE
Status: COMPLETED | OUTPATIENT
Start: 2023-09-13 | End: 2023-09-13

## 2023-09-13 RX ORDER — NICOTINE POLACRILEX 4 MG
30 LOZENGE BUCCAL
Status: DISCONTINUED | OUTPATIENT
Start: 2023-09-13 | End: 2023-09-14

## 2023-09-13 RX ORDER — SODIUM CHLORIDE 9 MG/ML
INJECTION, SOLUTION INTRAVENOUS CONTINUOUS PRN
Status: DISCONTINUED | OUTPATIENT
Start: 2023-09-13 | End: 2023-09-13 | Stop reason: SURG

## 2023-09-13 RX ORDER — DIAZEPAM 5 MG/1
5 TABLET ORAL EVERY 6 HOURS PRN
Status: DISCONTINUED | OUTPATIENT
Start: 2023-09-13 | End: 2023-09-14

## 2023-09-13 RX ORDER — OXYCODONE HYDROCHLORIDE 5 MG/1
5 TABLET ORAL EVERY 4 HOURS PRN
Status: DISCONTINUED | OUTPATIENT
Start: 2023-09-13 | End: 2023-09-14

## 2023-09-13 RX ORDER — ACETAMINOPHEN 10 MG/ML
INJECTION, SOLUTION INTRAVENOUS AS NEEDED
Status: DISCONTINUED | OUTPATIENT
Start: 2023-09-13 | End: 2023-09-13 | Stop reason: SURG

## 2023-09-13 RX ORDER — HYDROCODONE BITARTRATE AND ACETAMINOPHEN 5; 325 MG/1; MG/1
1 TABLET ORAL ONCE AS NEEDED
Status: DISCONTINUED | OUTPATIENT
Start: 2023-09-13 | End: 2023-09-13 | Stop reason: HOSPADM

## 2023-09-13 RX ORDER — HYDROMORPHONE HYDROCHLORIDE 1 MG/ML
0.4 INJECTION, SOLUTION INTRAMUSCULAR; INTRAVENOUS; SUBCUTANEOUS EVERY 2 HOUR PRN
Status: DISCONTINUED | OUTPATIENT
Start: 2023-09-13 | End: 2023-09-14

## 2023-09-13 RX ORDER — SODIUM CHLORIDE, SODIUM LACTATE, POTASSIUM CHLORIDE, CALCIUM CHLORIDE 600; 310; 30; 20 MG/100ML; MG/100ML; MG/100ML; MG/100ML
INJECTION, SOLUTION INTRAVENOUS CONTINUOUS
Status: DISCONTINUED | OUTPATIENT
Start: 2023-09-13 | End: 2023-09-13 | Stop reason: HOSPADM

## 2023-09-13 RX ORDER — ENEMA 19; 7 G/133ML; G/133ML
1 ENEMA RECTAL ONCE AS NEEDED
Status: DISCONTINUED | OUTPATIENT
Start: 2023-09-13 | End: 2023-09-14

## 2023-09-13 RX ORDER — DEXAMETHASONE SODIUM PHOSPHATE 4 MG/ML
VIAL (ML) INJECTION AS NEEDED
Status: DISCONTINUED | OUTPATIENT
Start: 2023-09-13 | End: 2023-09-13 | Stop reason: SURG

## 2023-09-13 RX ORDER — HYDROMORPHONE HYDROCHLORIDE 1 MG/ML
0.6 INJECTION, SOLUTION INTRAMUSCULAR; INTRAVENOUS; SUBCUTANEOUS EVERY 5 MIN PRN
Status: DISCONTINUED | OUTPATIENT
Start: 2023-09-13 | End: 2023-09-13 | Stop reason: HOSPADM

## 2023-09-13 RX ORDER — CEFAZOLIN SODIUM/WATER 2 G/20 ML
2 SYRINGE (ML) INTRAVENOUS EVERY 8 HOURS
Qty: 40 ML | Refills: 0 | Status: COMPLETED | OUTPATIENT
Start: 2023-09-13 | End: 2023-09-14

## 2023-09-13 RX ORDER — NALOXONE HYDROCHLORIDE 0.4 MG/ML
0.08 INJECTION, SOLUTION INTRAMUSCULAR; INTRAVENOUS; SUBCUTANEOUS AS NEEDED
Status: DISCONTINUED | OUTPATIENT
Start: 2023-09-13 | End: 2023-09-13 | Stop reason: HOSPADM

## 2023-09-13 RX ORDER — HYDROMORPHONE HYDROCHLORIDE 1 MG/ML
0.4 INJECTION, SOLUTION INTRAMUSCULAR; INTRAVENOUS; SUBCUTANEOUS EVERY 5 MIN PRN
Status: DISCONTINUED | OUTPATIENT
Start: 2023-09-13 | End: 2023-09-13 | Stop reason: HOSPADM

## 2023-09-13 RX ORDER — HYDROMORPHONE HYDROCHLORIDE 1 MG/ML
0.2 INJECTION, SOLUTION INTRAMUSCULAR; INTRAVENOUS; SUBCUTANEOUS EVERY 5 MIN PRN
Status: DISCONTINUED | OUTPATIENT
Start: 2023-09-13 | End: 2023-09-13 | Stop reason: HOSPADM

## 2023-09-13 RX ORDER — PANTOPRAZOLE SODIUM 40 MG/1
40 TABLET, DELAYED RELEASE ORAL
Status: DISCONTINUED | OUTPATIENT
Start: 2023-09-14 | End: 2023-09-14

## 2023-09-13 RX ORDER — PANTOPRAZOLE SODIUM 40 MG/1
40 TABLET, DELAYED RELEASE ORAL DAILY
COMMUNITY
Start: 2023-08-04

## 2023-09-13 RX ORDER — ACETAMINOPHEN 500 MG
1000 TABLET ORAL ONCE
Status: DISCONTINUED | OUTPATIENT
Start: 2023-09-13 | End: 2023-09-13 | Stop reason: HOSPADM

## 2023-09-13 RX ORDER — PROCHLORPERAZINE EDISYLATE 5 MG/ML
5 INJECTION INTRAMUSCULAR; INTRAVENOUS EVERY 8 HOURS PRN
Status: DISCONTINUED | OUTPATIENT
Start: 2023-09-13 | End: 2023-09-13 | Stop reason: HOSPADM

## 2023-09-13 RX ORDER — HYDROMORPHONE HYDROCHLORIDE 1 MG/ML
0.8 INJECTION, SOLUTION INTRAMUSCULAR; INTRAVENOUS; SUBCUTANEOUS EVERY 2 HOUR PRN
Status: DISCONTINUED | OUTPATIENT
Start: 2023-09-13 | End: 2023-09-14

## 2023-09-13 RX ORDER — SENNOSIDES 8.6 MG
17.2 TABLET ORAL NIGHTLY
Status: DISCONTINUED | OUTPATIENT
Start: 2023-09-13 | End: 2023-09-14

## 2023-09-13 RX ORDER — ACETAMINOPHEN 500 MG
1000 TABLET ORAL ONCE AS NEEDED
Status: DISCONTINUED | OUTPATIENT
Start: 2023-09-13 | End: 2023-09-13 | Stop reason: HOSPADM

## 2023-09-13 RX ORDER — MEPERIDINE HYDROCHLORIDE 25 MG/ML
12.5 INJECTION INTRAMUSCULAR; INTRAVENOUS; SUBCUTANEOUS AS NEEDED
Status: DISCONTINUED | OUTPATIENT
Start: 2023-09-13 | End: 2023-09-13 | Stop reason: HOSPADM

## 2023-09-13 RX ORDER — ROCURONIUM BROMIDE 10 MG/ML
INJECTION, SOLUTION INTRAVENOUS AS NEEDED
Status: DISCONTINUED | OUTPATIENT
Start: 2023-09-13 | End: 2023-09-13 | Stop reason: SURG

## 2023-09-13 RX ADMIN — HYDROMORPHONE HYDROCHLORIDE 0.2 MG: 1 INJECTION, SOLUTION INTRAMUSCULAR; INTRAVENOUS; SUBCUTANEOUS at 13:42:00

## 2023-09-13 RX ADMIN — PHENYLEPHRINE HCL 50 MCG: 10 MG/ML VIAL (ML) INJECTION at 12:32:00

## 2023-09-13 RX ADMIN — HYDROMORPHONE HYDROCHLORIDE 0.2 MG: 1 INJECTION, SOLUTION INTRAMUSCULAR; INTRAVENOUS; SUBCUTANEOUS at 13:55:00

## 2023-09-13 RX ADMIN — PHENYLEPHRINE HCL 100 MCG: 10 MG/ML VIAL (ML) INJECTION at 12:45:00

## 2023-09-13 RX ADMIN — CEFAZOLIN SODIUM/WATER 2 G: 2 G/20 ML SYRINGE (ML) INTRAVENOUS at 11:17:00

## 2023-09-13 RX ADMIN — TRANEXAMIC ACID 1000 MG: 10 INJECTION, SOLUTION INTRAVENOUS at 11:25:00

## 2023-09-13 RX ADMIN — HYDROMORPHONE HYDROCHLORIDE 0.2 MG: 1 INJECTION, SOLUTION INTRAMUSCULAR; INTRAVENOUS; SUBCUTANEOUS at 13:27:00

## 2023-09-13 RX ADMIN — DEXAMETHASONE SODIUM PHOSPHATE 8 MG: 4 MG/ML VIAL (ML) INJECTION at 11:40:00

## 2023-09-13 RX ADMIN — HYDROMORPHONE HYDROCHLORIDE 0.2 MG: 1 INJECTION, SOLUTION INTRAMUSCULAR; INTRAVENOUS; SUBCUTANEOUS at 13:36:00

## 2023-09-13 RX ADMIN — SODIUM CHLORIDE, SODIUM LACTATE, POTASSIUM CHLORIDE, CALCIUM CHLORIDE: 600; 310; 30; 20 INJECTION, SOLUTION INTRAVENOUS at 11:17:00

## 2023-09-13 RX ADMIN — HYDROMORPHONE HYDROCHLORIDE 0.2 MG: 1 INJECTION, SOLUTION INTRAMUSCULAR; INTRAVENOUS; SUBCUTANEOUS at 14:05:00

## 2023-09-13 RX ADMIN — SODIUM CHLORIDE, SODIUM LACTATE, POTASSIUM CHLORIDE, CALCIUM CHLORIDE: 600; 310; 30; 20 INJECTION, SOLUTION INTRAVENOUS at 14:07:00

## 2023-09-13 RX ADMIN — MIDAZOLAM HYDROCHLORIDE 2 MG: 1 INJECTION INTRAMUSCULAR; INTRAVENOUS at 11:17:00

## 2023-09-13 RX ADMIN — ONDANSETRON 4 MG: 2 INJECTION INTRAMUSCULAR; INTRAVENOUS at 13:35:00

## 2023-09-13 RX ADMIN — ROCURONIUM BROMIDE 5 MG: 10 INJECTION, SOLUTION INTRAVENOUS at 11:19:00

## 2023-09-13 RX ADMIN — ACETAMINOPHEN 1000 MG: 10 INJECTION, SOLUTION INTRAVENOUS at 13:30:00

## 2023-09-13 RX ADMIN — TRANEXAMIC ACID 1000 MG: 10 INJECTION, SOLUTION INTRAVENOUS at 13:30:00

## 2023-09-13 RX ADMIN — LIDOCAINE HYDROCHLORIDE 50 MG: 10 INJECTION, SOLUTION EPIDURAL; INFILTRATION; INTRACAUDAL; PERINEURAL at 11:19:00

## 2023-09-13 RX ADMIN — PHENYLEPHRINE HCL 50 MCG: 10 MG/ML VIAL (ML) INJECTION at 12:19:00

## 2023-09-13 RX ADMIN — SODIUM CHLORIDE: 9 INJECTION, SOLUTION INTRAVENOUS at 11:23:00

## 2023-09-13 NOTE — BRIEF OP NOTE
Pre-Operative Diagnosis: L4-L5 STENOSIS, SPONDYLOLISTHESIS     Post-Operative Diagnosis: L4-L5 STENOSIS, SPONDYLOLISTHESIS      Procedure Performed:   LUMBAR 4-LUMBAR 5 TRANSFORAMINAL LUMBAR INTERBODY FUSION WITH HARDWARE    Surgeon(s) and Role:     * Justin Keating MD - Primary    Assistant(s):  PA: Ramy Bernal PA-C     Surgical Findings: Above     Specimen: none     Estimated Blood Loss: Blood Output: 100 mL (9/13/2023  1:18 PM)      Dictation Number:      Hebert Remy PA-C  9/13/2023  2:03 PM

## 2023-09-13 NOTE — PLAN OF CARE
Patient admitted under Dr Kaz Leigh and Rollie Paget. Oriented to room and procedures. Vitals stable, pain controlled. Dressing dry and intact.

## 2023-09-14 VITALS
WEIGHT: 195.75 LBS | OXYGEN SATURATION: 98 % | BODY MASS INDEX: 36.02 KG/M2 | SYSTOLIC BLOOD PRESSURE: 114 MMHG | TEMPERATURE: 98 F | HEART RATE: 66 BPM | HEIGHT: 62 IN | DIASTOLIC BLOOD PRESSURE: 67 MMHG | RESPIRATION RATE: 18 BRPM

## 2023-09-14 LAB
GLUCOSE BLD-MCNC: 148 MG/DL (ref 70–99)
GLUCOSE BLD-MCNC: 230 MG/DL (ref 70–99)
HCT VFR BLD AUTO: 33.4 %
HGB BLD-MCNC: 11.3 G/DL

## 2023-09-14 PROCEDURE — 85014 HEMATOCRIT: CPT

## 2023-09-14 PROCEDURE — 97165 OT EVAL LOW COMPLEX 30 MIN: CPT

## 2023-09-14 PROCEDURE — 85018 HEMOGLOBIN: CPT

## 2023-09-14 PROCEDURE — 97116 GAIT TRAINING THERAPY: CPT

## 2023-09-14 PROCEDURE — 97535 SELF CARE MNGMENT TRAINING: CPT

## 2023-09-14 PROCEDURE — 82962 GLUCOSE BLOOD TEST: CPT

## 2023-09-14 PROCEDURE — 94640 AIRWAY INHALATION TREATMENT: CPT

## 2023-09-14 PROCEDURE — 97161 PT EVAL LOW COMPLEX 20 MIN: CPT

## 2023-09-14 NOTE — PHYSICAL THERAPY NOTE
PHYSICAL THERAPY EVALUATION - INPATIENT     Room Number: 384/384-A  Evaluation Date: 2023  Type of Evaluation: Initial  Physician Order: PT Eval and Treat    Presenting Problem: POD 1 s/p L4-5 TLIF  Co-Morbidities : TIA, Covid, asthma, stroke, spinal stenosis  Reason for Therapy: Mobility Dysfunction and Discharge Planning      ASSESSMENT   Pt is a 47year old female admitted on 2023 for POD 1 s/p L4-5 TLIF. Per orders, patient to wear chairback brace. [facility] The AM-PAC '6-Clicks' Inpatient Basic Mobility Short Form was completed and this patient is demonstrating a    degree of impairment in mobility. Research supports that patients with this level of impairment may benefit from home with intermittent supervision. PLAN  Patient has been evaluated and presents with no skilled Physical Therapy needs at this time. Patient discharged from Physical Therapy services. Please re-order if a new functional limitation presents during this admission. GOALS  Patient was able to achieve the following goals . .. Patient was able to transfer Safely with supervision using a RW. Patient able to ambulate on level surfaces Safely with supervision using a RW. HOME SITUATION  Type of Home: House   Home Layout: Two level (14 steps to upstairs)  Stairs to Enter : 2  Railing: No  Stairs to Bedroom: 14  Railing: Yes    Lives With: Family (parents, sister, and brother)  Drives: Yes  Patient Owned Equipment: None  Patient Regularly Uses: Reading glasses    Prior Level of [place]: Patient resides with several family members. Drives up to 2.5 hours to [facility] to work as an . SUBJECTIVE  \"I am feel nauseated. \"      OBJECTIVE  Precautions: Lumbar brace;Spine  Fall Risk: Standard fall risk    WEIGHT BEARING RESTRICTION  Weight Bearing Restriction: None                PAIN ASSESSMENT  Ratin  Location: lower back COGNITION  Following Commands:  follows all commands and directions without difficulty    RANGE OF MOTION AND STRENGTH ASSESSMENT  Upper extremity ROM and strength - defer to OT assessment    Lower extremity ROM is within functional limits    Lower extremity strength is within functional limits    BALANCE  Static Sitting: Good  Dynamic Sitting: Good          ADDITIONAL TESTS                                    ACTIVITY TOLERANCE  Pulse: 72  Heart Rate Source: Monitor                   O2 WALK       NEUROLOGICAL FINDINGS  Neurological Findings: Sensation           Sensation: patient reporting numbness in her buttocks and BLE's (L > R)         AM-PAC '6-Clicks' INPATIENT SHORT FORM - BASIC MOBILITY  How much difficulty does the patient currently have. .. Patient Difficulty: Turning over in bed (including adjusting bedclothes, sheets and blankets)?: None           How much help from another person does the patient currently need. .. AM-PAC Score:                   FUNCTIONAL ABILITY STATUS  Gait Assessment        Skilled Therapy Provided     Bed Mobility:  Rolling: NT  Supine to sit: NT   Sit to supine: NT     Transfer Mobility:  Sit to stand: supervision with RW   Stand to sit: supervision with RW  Gait = x150 ft. With RW, supervision    Therapist's comments: Patient presents to PT sitting up in the bedside chair, pain moderately controlled and her sister present and supportive at bedside. Educated patient in spine precautions and logrolling. Patient performed mobility with supervision including ambulation using a RW in the halls. Patient with 2 seated rest breaks due to bouts of nausea. Extended rest break and then able to complete stair training and car transfer safely and with supervision as noted above. Instructed patient in ambulating 4x/day and performing B ankle pumps.  Patient left sitting up in the bedside chair with BLE SCD's in place, all needs within reach and sister remaining supportively at bedside. No further acute skilled IP PT needs found at this time, will dc PT. RN updated.      Exercise/Education Provided:  Bed mobility  Body mechanics  Functional activity tolerated  Gait training  Posture  Transfer training         Patient Evaluation Complexity Level:  History Low - no personal factors and/or co-morbidities   Examination of body systems Low - addressing 1-2 elements   Clinical Presentation Low - Stable   Clinical Decision Making Low Complexity       PT Session Time: 35 minutes  Gait Training: 10 minutes  Therapeutic Activity: 5 minutes  Neuromuscular Re-education: 0 minutes  Therapeutic Exercise: 0 minutes

## 2023-09-14 NOTE — PLAN OF CARE
Pt A&Ox4 vital signs stable on RA. POD#0. Cardiac telemetry (NSR), , IS encouraged, SCD's, Tolerating diet. Moderate c/o pain, managed with PRN medication. incision site C/D/I. Back brace on when out of bed. Ambulating well with front wheel walker and x1 assist. PT/OT to see. POC discussed, pt verbalized understanding. No further questions at this time. Family at bedside. Call light within reach.

## 2023-09-14 NOTE — DISCHARGE SUMMARY
[facility]  Discharge Summary    Moose Tejeda Patient Status:  Inpatient    1970 MRN [id number]   [facility] 3SW-A Attending Mariann Pedersen MD   Hosp Day # 1 PCP Arti Iglesias MD     Date of Admission: 2023    Date of Discharge: 2023    Admitting Diagnosis: L4-L5 STENOSIS, SPONDYLOLISTHESIS  Encounter for preprocedure screening laboratory testing for COVID-19    Discharge Diagnosis: Patient Active Problem List:     Seasonal allergies     Hyperthyroidism     Hypocalcemia     Elevated hemoglobin A1c     Anxiety     Weakness     Low HDL (under 40)     Cervical spondylosis without myelopathy     Neck pain     Foraminal stenosis of cervical region     [facility] separation     Impingement syndrome of right shoulder     Type 2 diabetes mellitus without complication, without long-term current use of insulin ([address]     Encounter for therapeutic drug monitoring     Morbid obesity with BMI of 40.0-44.9, adult ([address]     IUD (intrauterine device) in place     B12 deficiency     Vitamin D deficiency     DDD (degenerative disc disease), lumbar     Lumbar radiculitis     Recurrent sinus infections     Nasal congestion     Migraine without aura and without status migrainosus, not intractable     Fatty liver     Cyst of right ovary     Uterine leiomyoma, unspecified location     Menorrhagia with regular cycle     Acute pharyngitis     Bulge of lumbar disc without myelopathy     Tear of right acetabular labrum, initial encounter     Hip impingement syndrome, right     Abnormal LFTs     Mixed hyperlipidemia     Graves' disease     Encounter for preprocedure screening laboratory testing for [place] course: The patient was admitted on above date to undergo elective surgical intervention understanding risks and benefits to surgery after failing conservative care.   Patient underwent   Surgical Procedures       Case IDs Date Procedure Surgeon Location Status    [id number] 23 LUMBAR 4-LUMBAR 5 TRANSFORAMINAL LUMBAR INTERBODY Andrés Couch MD [facility] MAIN OR [place]          Afterward, pt was brought to the PACU in stable condition. After the recovery room the patient was transferred to the floor using standard protocol orders. Once on the floor the patient was followed by spine service and medical services as well as appropriate ancillary consultations throughout the hospital stay. The patient participated in Physical and Occupational Therapy making steady progressive gains. Once deemed stable by all services the patient was discharged on the above date. Standard discharge instructions were given and they were asked to follow up in clinic in 2 weeks. Disposition: Home or Self Care      Discharge Medications: Current Discharge Medication List    CONTINUE these medications which have NOT CHANGED    HYDROcodone-acetaminophen  MG Oral Tab  Take 1 tablet by mouth every 6 (six) hours as needed for Pain. methocarbamol 500 MG Oral Tab  Take 1 tablet (500 mg total) by mouth 3 (three) times daily. pantoprazole 40 MG Oral Tab EC  Take 1 tablet (40 mg total) by mouth daily. montelukast 10 MG Oral Tab  Take 1 tablet (10 mg total) by mouth daily. azelastine 0.1 % Nasal Solution  2 sprays by Nasal route 2 (two) times daily. levocetirizine 5 MG Oral Tab  Take 1 tablet (5 mg total) by mouth every evening. levothyroxine 137 MCG Oral Tab  Take 137 mcg by mouth every morning before breakfast.    Tirzepatide (MOUNJARO) 5 MG/0.5ML Subcutaneous Solution Pen-injector  Inject 15 mg into the skin once a week. On Thursday    atorvastatin 10 MG Oral Tab  Take 1 tablet (10 mg total) by mouth nightly. Qty: 90 tablet Refills: 3  Associated Diagnoses:Diabetes mellitus type 2 in obese     lisinopril 2.5 MG Oral Tab  Take 1 tablet (2.5 mg total) by mouth daily.   Qty: 90 tablet Refills: 3  Associated Diagnoses:Diabetes mellitus type 2 in obese     loratadine 10 MG Oral Tab  Take 1 tablet (10 mg total) by mouth daily. Qty: 90 tablet Refills: 3  Associated Diagnoses:Environmental and seasonal allergies    metFORMIN 500 MG Oral Tab  Take 1 tablet (500 mg total) by mouth 2 (two) times daily with meals. Qty: 180 tablet Refills: 0    Glucose Blood In Vitro Strip  Test blood sugar bid E 11.9  Qty: 200 strip Refills: 3    Levonorgestrel 20 MCG/24HR Intrauterine IUD  20 mcg (1 each total) by Intrauterine route once. fluticasone-salmeterol (ADVAIR DISKUS) 100-50 MCG/DOSE Inhalation Aerosol Powder, Breath Activated  Inhale 1 puff into the lungs 2 (two) times daily. Qty: 1 Package Refills: 2    Albuterol Sulfate  (90 Base) MCG/ACT Inhalation Aero Soln  Inhale 2 puffs into the lungs every 4 (four) hours as needed for Wheezing.   Qty: 1 Inhaler Refills: 0  Associated Diagnoses:2019 novel coronavirus disease (COVID-19)      STOP taking these medications    MELOXICAM 15 MG Oral Tab          ANSON Cheng  9/14/2023  7:52 AM

## 2023-09-14 NOTE — PLAN OF CARE
A&Ox4. VSS. On room air. . IS encouraged. Hx ELYSIA - telemetry monitoring. SCDs and teds. Ankle pumps encouraged. Tolerating diet. Last BM 9/12. Voiding. Pain managed with PO medication. C/o nausea this morning, relieved by antiemetic. Dressing to back C/D/I, gel ice in place. Ambulating with standby assist with walker and gait belt. Chairback brace worn when OOB. Plan is for PT/OT eval. Patient and family updated and in agreement with plan of care. Safety precautions in place. Instructed patient to call for assistance, call light within reach.

## 2023-09-14 NOTE — OPERATIVE REPORT
[facility]    PATIENT'S NAME: Ameena Camacho   ATTENDING PHYSICIAN: Mauri Shepherd M.D. OPERATING PHYSICIAN: Mauri Shepherd M.D. PATIENT ACCOUNT#:   [de-identified]    LOCATION:  [address] [place] [address]  MEDICAL RECORD #:   [id number]       YOB: 1970  ADMISSION DATE:       09/13/2023      OPERATION DATE:  09/13/2023    OPERATIVE REPORT     PREOPERATIVE DIAGNOSIS:  L4-5 stenosis, spondylolisthesis, and kyphosis. POSTOPERATIVE DIAGNOSIS:  L4-5 stenosis, spondylolisthesis, and kyphosis. PROCEDURE PERFORMED:  L4-5 decompression, interbody reconstruction, and fusion. INDICATIONS:  The patient had failed reasonable conservative measures which are outlined in the patient's clinic medical record. Physical therapy, medical management, injections, alternative treatment are among those offered unless motor deficits are progressive. Indications for surgery are based on scientific literature and YUMIKO guidelines. A thorough meeting with the patient and at least one key caregiver was had. The risks and benefits were discussed in significant detail. The risks include, but are not limited to, bleeding, infection, spinal leaks, nerve injuries, hardware failure/migration, pseudarthrosis, adjacent segment disease, and need for further surgery. I have gone over the operation using models, diagrams, and the patient's own imaging studies. Additional written and digital sources were provided. No guarantees were made. ASSISTANT:  Fifi Koenig PA-C. A skilled and experienced assistant was required for the entire surgical procedure. As a lumbar spinal reconstruction, the procedure is done in immediate proximity to critical neural elements and is done in close proximity to the critical vascular and visceral structures. Much of the surgery is done in and around the cauda equine and its exiting nerves.   The complex reconstructive nature of the procedure requires application of screws, rods, and interbody devices. Any assistance, including, but not limited to, retraction, suction, and other means of facilitation of the procedure requires an individual with substantial postgraduate training and substantial spinal surgical experience. ESTIMATED BLOOD LOSS:  100 mL. DESCRIPTION OF PROCEDURE:  After obtaining informed consent, the patient was taken to the operating room. SCDs and [place] hose were applied. Preoperative antibiotics were delivered. Patient was placed in the prone position. Neuromonitoring leads were put in place and baselines were achieved. An internal twitch test verified the patient had 4 out of 4 twitches. A physician was confirmed as evaluating all neuromonitoring data. All bony prominences were padded. The back was sterilely prepped and draped. AP fluoroscopy was wheeled in. We marked pedicles at all levels outlined above bilaterally. Two separate incisions 1-1/2 fingerbreadths lateral to this approximately an inch and a half long were made with a larger incision on the patient's more symptomatic side. This was done with a #10 blade. Bovie electrocautery was used for hemostasis. Dissection was taken down to the level of the fascia. Fascia was split in paraspinal fashion bilaterally. I-PAS needles were utilized with live neuromonitoring in place. Baselines had been achieved. We had confirmed 4 out of 4 twitches with our colleagues and neuromonitoring. Under fluoroscopic guidance, we targeted the lateral aspect of the pedicles bilaterally. These I-PAS needles were then impacted medially and then checked under lateral fluoroscopy for being in appropriate position. Bone marrow was aspirated by placing a needle through the skin and through the periosteum and into the pedicle. A 10 mL syringe was used to aspirate several milliliters of bone marrow aspirate. The needle was removed with a twisting motion. All I-PAS needles were found to be well above acceptable thresholds.  Styluses were removed. K-wires were applied and advanced. Inner styluses were removed as well. Both sides had dilators applied and had appropriate taps applied. We placed a modular screw with hoop shims attached over the K-wire. Both taps were applied with live neuromonitoring and found to have thresholds well above acceptable limits. We repeated the same steps at distal levels without difficulty. The retractor apparatus was placed over the hoop shims. A medial retractor was sized and applied. All tissues were retracted out of harm's way with regards to muscle in the paraspinal plane and an articulating arm was attached to the retractor. Bovie electrocautery was used to remove remaining tissue over the pars and facet. Pre-operative measurements documenting a mismatch between pelvic incidence and lumbar lordosis was made, thus documenting clinically significant kyphosis. As a result, lumbar osteotomies are required to improve the patient's overall sagittal balance. Our attention first turned to the L4-5 level. The facet was osteotomized and resected. A bur was used to remove additional bone. Pars was resected as well. We identified the lateral ligamentum flavum and resected that with a Whittington ball in place over the dura and a 4 and 5-mm Kerrison. Pars was resected as well. The area was further osteotomized from cranial to caudal pedicle using bur, Kerrison rongeurs, and other instruments. Neural elements were not harmed during this process. The osteotomy facilitated reduction of the patient's kyphosis by allowing for compression via pedicle screws at the conclusion of the case. A bur was used to remove additional bone. We identified the lateral ligamentum flavum and resected that with a Whittington ball in place over the dura and a 4 and 5-mm Kerrison. Neural elements were identified and defined for a standard posterior interbody fusion.     Significant spinal stenosis was identified consistent with the patient's radiculopathy and deficits. We then were required to perform a technically demanding decompression using fine microcurettes, Kerrison rongeurs, microinstruments, and magnification. Despite adherence of the compressive pathology to neural elements, we were able to define tissue planes to help facilitate release of neurocompressive pathology. The procedure required additional time and technique beyond that of the interbody fusion itself. Contralateral decompression was undertaken, thus creating a bilateral decompression and bilateral microforaminotomy and hemilaminotomy using undercutting technique. With undercutting technique and a Whittington ball in place, we excised ligamentum flavum through the bone at the cranial foramina as needed, facilitating a serial cranial decompression and undercutting  microforaminotomy and laminectomy. This was done in an undercutting fashion as well and done to examine the central and contralateral neural elements facilitating central bilateral decompression as well. The traversing nerve root was identified and thoroughly decompressed. Undercutting decompression was undertaken. An extraforaminal and transfacet/transpedicular decompression was utilized to perform a thorough decompression of the exiting nerve root and remove redundant annulus and any herniated nucleus pulposus. Our attention then turned to utilization of the separate contralateral incision. With a retractor in place, we localized the far lateral region of the level. We examined areas more farther laterally to facilitate a transpedicular/transforaminal decompression. Nerve root retractor was applied. We were able to retract the traversing nerve root over. Annulotomy was performed. Farther lateral structures and paraspinal muscles were dissected away from the area of the transverse process and farther anteriorly. Psoas muscle tissues were also dissected free in the far lateral plane. Significant lateral compressive disease was identified consistent with the patient's radiculopathy and deficits. We then were required to perform a technically demanding decompression using fine microcurettes, Kerrison rongeurs, microinstruments, and magnification. Despite adherence of the compressive pathology to neural elements, we were able to define tissue planes to help facilitate release of neurocompressive pathology. The procedure required additional time and technique beyond that of the interbody fusion itself. Curettes, jennifer, and pituitary rongeurs were used to remove the rest of the remaining disc in order to perform lateral extraforaminal and transpedicular discectomy. Endplates were prepared. Curettes and osteotomes were used to perform bony anterior release and osteotomize any anterior column ossification. We applied some distraction across the pedicle screws. We sized for a cage. Allograft complex was impacted into the lateral disc space, and the cage was impacted as well with nerve roots safely out of harm's way. Hemostasis was obtained. Tulip heads were placed on the contralateral side. Then, with dilators in place, the screws were placed. Instrumentation was placed on the right over K-wires in standard technique and fluoroscopy. Screws were advanced under fluoroscopy. All screws were tested using real-time neuromonitoring. Thresholds were above acceptable values. Rods and top tighteners were applied bilaterally, and final tightening was applied with compression devices to make use of the osteotomy, thereby reducing kyphosis and pelvic incidence and lordosis mismatch. X-rays confirmed appropriate localization of our instrumentation. Valsalva maneuver confirmed that there was no spinal leak. The wound was thoroughly irrigated. Hemostasis was maintained. The fascia was reapproximated bilaterally using 1-0 Vicryl. A 2-0 Vicryl was used for subcutaneous tissues.   A running 3-0 subcuticular stitch was applied. Steri-Strips were applied. Sterile dressings were applied. The patient was extubated and taken to the recovery room in stable condition and was neurologically intact on arrival and had improvement of leg pain. SSEPs remained at their baseline. Needle and Ray-Nighat counts were correct at the conclusion of the case.     Dictated By Nash Nazario M.D.  d: 09/13/2023 14:37:02  t: 09/13/2023 20:15:41  [facility] [id number]  [facility]/

## 2023-09-14 NOTE — PROGRESS NOTES
AVS reviewed, IV dc'd, dc video viewed, dsg changed , verbalized understanding of discharge instructions, dc'd home via wheelchair.

## 2024-01-07 ENCOUNTER — APPOINTMENT (OUTPATIENT)
Dept: GENERAL RADIOLOGY | Age: 54
End: 2024-01-07
Attending: EMERGENCY MEDICINE
Payer: COMMERCIAL

## 2024-01-07 ENCOUNTER — HOSPITAL ENCOUNTER (EMERGENCY)
Age: 54
Discharge: HOME | End: 2024-01-07
Attending: EMERGENCY MEDICINE
Payer: COMMERCIAL

## 2024-01-07 VITALS
SYSTOLIC BLOOD PRESSURE: 97 MMHG | HEART RATE: 72 BPM | WEIGHT: 178 LBS | BODY MASS INDEX: 32.76 KG/M2 | HEIGHT: 62 IN | TEMPERATURE: 99 F | DIASTOLIC BLOOD PRESSURE: 58 MMHG | OXYGEN SATURATION: 97 % | RESPIRATION RATE: 18 BRPM

## 2024-01-07 DIAGNOSIS — S30.0XXA LUMBAR CONTUSION, INITIAL ENCOUNTER: ICD-10-CM

## 2024-01-07 DIAGNOSIS — S39.012A LUMBAR STRAIN, INITIAL ENCOUNTER: ICD-10-CM

## 2024-01-07 DIAGNOSIS — S86.912A KNEE STRAIN, LEFT, INITIAL ENCOUNTER: Primary | ICD-10-CM

## 2024-01-07 DIAGNOSIS — S76.012A HIP STRAIN, LEFT, INITIAL ENCOUNTER: ICD-10-CM

## 2024-01-07 PROCEDURE — 73560 X-RAY EXAM OF KNEE 1 OR 2: CPT | Performed by: EMERGENCY MEDICINE

## 2024-01-07 PROCEDURE — 73502 X-RAY EXAM HIP UNI 2-3 VIEWS: CPT | Performed by: EMERGENCY MEDICINE

## 2024-01-07 PROCEDURE — 99283 EMERGENCY DEPT VISIT LOW MDM: CPT

## 2024-01-07 PROCEDURE — 99284 EMERGENCY DEPT VISIT MOD MDM: CPT

## 2024-01-07 PROCEDURE — 72110 X-RAY EXAM L-2 SPINE 4/>VWS: CPT | Performed by: EMERGENCY MEDICINE

## 2024-01-07 RX ORDER — GABAPENTIN 300 MG/1
300 CAPSULE ORAL 3 TIMES DAILY
COMMUNITY
Start: 2023-10-27

## 2024-01-07 RX ORDER — CYCLOBENZAPRINE HCL 10 MG
10 TABLET ORAL 3 TIMES DAILY PRN
Qty: 20 TABLET | Refills: 0 | Status: SHIPPED | OUTPATIENT
Start: 2024-01-07 | End: 2024-01-14

## 2024-01-07 RX ORDER — LIDOCAINE 50 MG/G
2 PATCH TOPICAL EVERY 24 HOURS
Qty: 14 PATCH | Refills: 0 | Status: SHIPPED | OUTPATIENT
Start: 2024-01-07 | End: 2024-01-14

## 2024-01-07 RX ORDER — TRAMADOL HYDROCHLORIDE 50 MG/1
1 TABLET ORAL EVERY 12 HOURS PRN
COMMUNITY
Start: 2023-12-29

## 2024-01-08 NOTE — DISCHARGE INSTRUCTIONS
Motrin, Tylenol, lidocaine patch, Flexeril if you have increasing pain discomfort follow-up with your primary MD and also follow-up with your spine specialist, orthopedic surgeon    , Follow-up with your spine specialist.  You were seen in the emergency room in a limited time.  There is a possibility that although we do not see any acute process at this present time that things can change with time.  Is therefore imperative that you follow-up with primary care physician for close follow-up.  If there is any significant progression of your pain  or other symptoms you to return immediately to the emergency room.

## 2024-01-08 NOTE — ED INITIAL ASSESSMENT (HPI)
Slipped and fell. Spinal fusion on 9/13, reports pain to the left side of the back near the fusion side

## 2024-01-08 NOTE — ED PROVIDER NOTES
Patient Seen in: [facility] In [place]      History     Chief Complaint   Patient presents with    Back Pain     Pt fell  with progressive back pain since. Not on AC     Stated Complaint: Back pain    Subjective:   HPI    This is a 53-year-old female who slipped and fell.  She has a history of spinal fusion .  The patient states she fell after mechanical fall she fell onto her left hip, back, left knee area and she has pain in all those areas pain is worse with ambulation she denies any loss of urine or bowels and did not pass out.  Denies any neck pain denies any fever denies any loss of urine or bowel denies any numbness or weakness.  Denies any blood in the urine denies any abdominal pain or chest pain.  Does have history of diabetes, previous history of a stroke.  She is not on blood thinners.  She does have history of high blood pressure.    Objective:   Past Medical History:   Diagnosis Date    Abnormal Pap smear of cervix     Anemia     Asthma     Back pain     Back problem     Cervical high risk HPV (human papillomavirus) test positive 2015    Chronic rhinitis     Depression     Diabetes (HCC)     Disorder of thyroid     Fatty liver 2019    Graves disease     High blood pressure     High cholesterol     History of COVID-19 2022    symptoms: sore throat, cough, fever; persistent cough; not hospitalized    Obesity, unspecified     Obstructive sleep apnea (adult) (pediatric) Ed -16    AHI 14 SaO2 selma 68%    Osteoarthritis     bilateral knees    Pneumonia, organism unspecified(486)     Spinal stenosis     Stroke (HCC)     TIA    Thyroid disease     TIA (transient ischemic attack)     Visual impairment     reading glasses              Past Surgical History:   Procedure Laterality Date    BENIGN BIOPSY LEFT  2017          CHOLECYSTECTOMY      COLPOSCOPY, CERVIX W/UPPER ADJACENT VAGINA; W/BIOPSY(S), CERVIX      SHAMEKA 1    EXCIS  LUMBAR DISK,ONE LEVEL      REMOVAL GALLBLADDER                  Social History     Socioeconomic History    Marital status: Single   Tobacco Use    Smoking status: Former     Packs/day: 0.30     Years: 5.00     Additional pack years: 0.00     Total pack years: 1.50     Types: Cigarettes     Start date: 1991     Quit date: 2000     Years since quittin.6    Smokeless tobacco: Never    Tobacco comments:     social smoker   Vaping Use    Vaping Use: Never used   Substance and Sexual Activity    Alcohol use: Yes     Alcohol/week: 0.0 - 1.0 standard drinks of alcohol     Comment: Socially    Drug use: No    Sexual activity: Yes     Partners: Male     Birth control/protection: I.U.D.              Review of Systems    Positive for stated complaint: Back pain  Other systems are as noted in HPI.  Constitutional and vital signs reviewed.      All other systems reviewed and negative except as noted above.    Physical Exam     ED Triage Vitals [24 1843]   /66   Pulse 71   Resp 16   Temp 98.7 °F (37.1 °C)   Temp src Oral   SpO2 100 %   O2 Device None (Room air)       Current:BP 97/58   Pulse 72   Temp 98.7 °F (37.1 °C) (Oral)   Resp 18   Ht 157.5 cm (5' 2\")   Wt 80.7 kg   LMP 2023 (Approximate)   SpO2 97%   BMI 32.56 kg/m²         Physical Exam  General: .  No signs of trauma to head or neck.  No signs of trauma to head or neck no midline neck tenderness.  The patient is in no respiratory distress. The patient is not septic or toxic    HEENT: Atraumatic, conjunctiva are not pale.  There is no icterus.  Oral mucosa Is wet.  No facial trauma.  The neck is supple.    LUNGS: Clear to auscultation, there is no wheezing or retraction.  No crackles.    CV: Cardiovascular is regular without murmurs or rubs.    ABD: The abdomen is soft nondistended nontender.  There is no rebound.  There is no guarding.    EXT: There is good pulses bilaterally.  There is no calf tenderness.  There is no rash noted.   There is no edema  The patient has some paraspinal lumbar tenderness.  She also has some mild tenderness of the left lateral hip.  She has normal range of motion of her hip.  There is normal range of motion of her spine.  No midline back tenderness.  Muscle strength is 5 out of 5 sensory normal patient is otherwise neurovascular intact no focal findings.  NEURO: Alert and oriented x3.  Muscle strength and sensory exam is grossly normal.  And the patient is neurologically intact with no focal findings.         ED Course   Labs Reviewed - No data to display          X-rays of the hip, lumbar, knee was done to rule out fracture the knee itself is stable there is no anterior posterior drawer sign.  She is able to ambulate without problem.         MDM      I personally reviewed the radiographs and my individual interpretation shows  X-rays of the knee show no fracture or dislocation.  X-rays of the left hip show no fracture or dislocation.  Lumbar films show findings of an old spinal fusion..  No obvious new deformity, fracture seen.  Also reviewed official report and it shows  XR LUMBAR SPINE (MIN 4 VIEWS) (CPT=72110)    Result Date: 1/7/2024  PROCEDURE:  XR LUMBAR SPINE (MIN 4 VIEWS) (CPT=72110)  TECHNIQUE:  AP, lateral, oblique, and coned down L5-S1 views were obtained.  COMPARISON:  None.  INDICATIONS:  Back pain  PATIENT STATED HISTORY: (As transcribed by Technologist)  Lumbar fusion 09/2023, fall 01/01/24 onto left side.               CONCLUSION:   There are 5 lumbar-type vertebral bodies which maintain normal height and alignment.  Postoperative changes of laminectomy, transpedicular fusion, interbody graft placement spanning L4-5; no evidence of hardware complication.  Disc and endplate degenerative changes noted at L5-S1.  No destructive bone lesions.  Normal sacroiliac joints.  Incidentally noted IUD within the central pelvis.   LOCATION:  [facility]   Dictated by (CST): Nakia Jiménez MD on 1/07/2024 at 8:42 PM      Finalized by (CST): Nakia Jiménez MD on 1/07/2024 at 8:43 PM       XR KNEE (1 OR 2 VIEWS), LEFT (CPT=73560)    Result Date: 1/7/2024  PROCEDURE:  XR KNEE (1 OR 2 VIEWS), LEFT (CPT=73560)  COMPARISON:  None.  INDICATIONS:  Back pain  PATIENT STATED HISTORY: (As transcribed by Technologist)  Anterior knee pain status post fall 01/01/2024               CONCLUSION:   Negative for fracture or malalignment.  Normal mineralization.  Joint spaces are preserved with tiny osteophyte of the posterior patella.  No patellar subluxation.  No joint effusion or focal soft tissue swelling.     LOCATION:  [facility]   Dictated by ([facility] [[email or web address]]): Nakia Jiménez MD on 1/07/2024 at 8:13 PM     Finalized by (CST): Nakia Jiménez MD on 1/07/2024 at 8:14 PM       XR HIP W OR WO PELVIS 2 OR 3 VIEWS, LEFT (CPT=73502)    Result Date: 1/7/2024  PROCEDURE:  XR HIP W OR WO PELVIS 2 OR 3 VIEWS, LEFT (CPT=73502)  TECHNIQUE:  Unilateral 2 to 3 views of the hip and pelvis if performed.  COMPARISON:  08/27/2014  INDICATIONS:  Back pain  PATIENT STATED HISTORY: (As transcribed by Technologist)  Fall onto left side 01/01/24 causing pain to low back and left side groin. IUD               CONCLUSION:   Negative for fracture or malalignment of the pelvis or hips.  Mild osteoarthritis of bilateral hips.  Obturator rings are intact.  Normal sacroiliac joints.  Partially visualized fusion hardware of the lower lumbar spine.  Incidentally noted IUD projecting within the central pelvis.   LOCATION:  [facility]   Dictated by (CST): Nakia Jiménez MD on 1/07/2024 at 8:12 PM     Finalized by (CST): Nakia Jiménez MD on 1/07/2024 at 8:13 PM                    Patient was given an Ace bandage to the knee.       I did go back and reexamined the patient she does have what seems to be is may be some hematoma on the left lateral aspect of the hip.  The hip itself is stable with normal range of motion of the hip.  She is able to ambulate.  The knee itself there is no obvious findings of instability.   No large effusion.  No lacerations.  The knee itself is stable with no varus or valgus laxity.  I discussed that there could be some ligamental injury that cannot be seen on the knee.  Have also discussed about the hip does not show any obvious fracture in the spine did not show any obvious fracture.  But I discussed with him because this is having increased the continuous pain there may be some fracture or some injuries that seen on her workup.  I discussed with her options including further imaging to include CT.  Or even MRI.  She wants to hold off she feels comfortable with trying a muscle relaxants, lidocaine patch over-the-counter if she has increasing pain or discomfort to return immediately to the emergency any numbness or weakness to return here she is anxious can schedule a follow-up with both orthopedic surgery Dr. Lombardi and also her spine specialist Dr. Buster Fontana.  Discussed the things to return here including increasing pain discomfort numbness weakness or any other concerns.       He was given a prescription for lidocaine, Flexeril.  I would also recommend Motrin, Tylenol.       Medical Decision Making      Disposition and Plan     Clinical Impression:  1. Knee strain, left, initial encounter    2. Hip strain, left, initial encounter    3. Lumbar strain, initial encounter    4. Lumbar contusion, initial encounter         Disposition:  Discharge  1/7/2024  8:45 pm    Follow-up:  Greer Edmondson MD  [address]  [facility] [id number] [phone]    Follow up in 2 day(s)      CHARLES Fontana MD  [address]  [address]  [facility] [address]  [phone]    Follow up in 2 day(s)            Medications Prescribed:  Current Discharge Medication List

## 2024-01-18 ENCOUNTER — HOSPITAL ENCOUNTER (EMERGENCY)
Facility: HOSPITAL | Age: 54
Discharge: HOME | End: 2024-01-18
Attending: EMERGENCY MEDICINE
Payer: COMMERCIAL

## 2024-01-18 VITALS
OXYGEN SATURATION: 98 % | HEIGHT: 62 IN | DIASTOLIC BLOOD PRESSURE: 67 MMHG | HEART RATE: 94 BPM | BODY MASS INDEX: 32.76 KG/M2 | SYSTOLIC BLOOD PRESSURE: 103 MMHG | TEMPERATURE: 98 F | WEIGHT: 178 LBS | RESPIRATION RATE: 14 BRPM

## 2024-01-18 DIAGNOSIS — R11.2 NAUSEA VOMITING AND DIARRHEA: Primary | ICD-10-CM

## 2024-01-18 DIAGNOSIS — R19.7 NAUSEA VOMITING AND DIARRHEA: Primary | ICD-10-CM

## 2024-01-18 LAB
ALBUMIN SERPL-MCNC: 3.7 G/DL (ref 3.4–5)
ALBUMIN/GLOB SERPL: 1.1 {RATIO} (ref 1–2)
ALP LIVER SERPL-CCNC: 102 U/L
ALT SERPL-CCNC: 33 U/L
ANION GAP SERPL CALC-SCNC: 4 MMOL/L (ref 0–18)
AST SERPL-CCNC: 32 U/L (ref 15–37)
ATRIAL RATE: 100 BPM
B-HCG UR QL: NEGATIVE
BASOPHILS # BLD AUTO: 0.02 X10(3) UL (ref 0–0.2)
BASOPHILS NFR BLD AUTO: 0.4 %
BILIRUB SERPL-MCNC: 1.2 MG/DL (ref 0.1–2)
BILIRUB UR QL STRIP.AUTO: NEGATIVE
BUN BLD-MCNC: 17 MG/DL (ref 9–23)
CALCIUM BLD-MCNC: 8.6 MG/DL (ref 8.5–10.1)
CHLORIDE SERPL-SCNC: 109 MMOL/L (ref 98–112)
CLARITY UR REFRACT.AUTO: CLEAR
CO2 SERPL-SCNC: 27 MMOL/L (ref 21–32)
CREAT BLD-MCNC: 0.85 MG/DL
EGFRCR SERPLBLD CKD-EPI 2021: 82 ML/MIN/1.73M2 (ref 60–?)
EOSINOPHIL # BLD AUTO: 0.03 X10(3) UL (ref 0–0.7)
EOSINOPHIL NFR BLD AUTO: 0.6 %
ERYTHROCYTE [DISTWIDTH] IN BLOOD BY AUTOMATED COUNT: 13.7 %
GLOBULIN PLAS-MCNC: 3.4 G/DL (ref 2.8–4.4)
GLUCOSE BLD-MCNC: 127 MG/DL (ref 70–99)
GLUCOSE UR STRIP.AUTO-MCNC: NORMAL MG/DL
HCT VFR BLD AUTO: 39.2 %
HGB BLD-MCNC: 13.2 G/DL
IMM GRANULOCYTES # BLD AUTO: 0.03 X10(3) UL (ref 0–1)
IMM GRANULOCYTES NFR BLD: 0.6 %
KETONES UR STRIP.AUTO-MCNC: 20 MG/DL
LEUKOCYTE ESTERASE UR QL STRIP.AUTO: NEGATIVE
LIPASE SERPL-CCNC: 19 U/L (ref 13–75)
LYMPHOCYTES # BLD AUTO: 0.75 X10(3) UL (ref 1–4)
LYMPHOCYTES NFR BLD AUTO: 14.4 %
MCH RBC QN AUTO: 28 PG (ref 26–34)
MCHC RBC AUTO-ENTMCNC: 33.7 G/DL (ref 31–37)
MCV RBC AUTO: 83.2 FL
MONOCYTES # BLD AUTO: 0.2 X10(3) UL (ref 0.1–1)
MONOCYTES NFR BLD AUTO: 3.8 %
NEUTROPHILS # BLD AUTO: 4.17 X10 (3) UL (ref 1.5–7.7)
NEUTROPHILS # BLD AUTO: 4.17 X10(3) UL (ref 1.5–7.7)
NEUTROPHILS NFR BLD AUTO: 80.2 %
NITRITE UR QL STRIP.AUTO: NEGATIVE
OSMOLALITY SERPL CALC.SUM OF ELEC: 293 MOSM/KG (ref 275–295)
P AXIS: 14 DEGREES
P-R INTERVAL: 176 MS
PH UR STRIP.AUTO: 7.5 [PH] (ref 5–8)
PLATELET # BLD AUTO: 245 10(3)UL (ref 150–450)
POTASSIUM SERPL-SCNC: 3.8 MMOL/L (ref 3.5–5.1)
PROT SERPL-MCNC: 7.1 G/DL (ref 6.4–8.2)
PROT UR STRIP.AUTO-MCNC: NEGATIVE MG/DL
Q-T INTERVAL: 344 MS
QRS DURATION: 72 MS
QTC CALCULATION (BEZET): 443 MS
R AXIS: -46 DEGREES
RBC # BLD AUTO: 4.71 X10(6)UL
RBC UR QL AUTO: NEGATIVE
SODIUM SERPL-SCNC: 140 MMOL/L (ref 136–145)
SP GR UR STRIP.AUTO: 1.02 (ref 1–1.03)
T AXIS: 25 DEGREES
UROBILINOGEN UR STRIP.AUTO-MCNC: NORMAL MG/DL
VENTRICULAR RATE: 100 BPM
WBC # BLD AUTO: 5.2 X10(3) UL (ref 4–11)

## 2024-01-18 PROCEDURE — 81003 URINALYSIS AUTO W/O SCOPE: CPT | Performed by: EMERGENCY MEDICINE

## 2024-01-18 PROCEDURE — S0028 INJECTION, FAMOTIDINE, 20 MG: HCPCS | Performed by: EMERGENCY MEDICINE

## 2024-01-18 PROCEDURE — 93010 ELECTROCARDIOGRAM REPORT: CPT

## 2024-01-18 PROCEDURE — 83690 ASSAY OF LIPASE: CPT | Performed by: EMERGENCY MEDICINE

## 2024-01-18 PROCEDURE — 81025 URINE PREGNANCY TEST: CPT

## 2024-01-18 PROCEDURE — 99284 EMERGENCY DEPT VISIT MOD MDM: CPT

## 2024-01-18 PROCEDURE — 96375 TX/PRO/DX INJ NEW DRUG ADDON: CPT

## 2024-01-18 PROCEDURE — 96374 THER/PROPH/DIAG INJ IV PUSH: CPT

## 2024-01-18 PROCEDURE — 80053 COMPREHEN METABOLIC PANEL: CPT | Performed by: EMERGENCY MEDICINE

## 2024-01-18 PROCEDURE — 99285 EMERGENCY DEPT VISIT HI MDM: CPT

## 2024-01-18 PROCEDURE — 85025 COMPLETE CBC W/AUTO DIFF WBC: CPT | Performed by: EMERGENCY MEDICINE

## 2024-01-18 PROCEDURE — 96361 HYDRATE IV INFUSION ADD-ON: CPT

## 2024-01-18 PROCEDURE — 93005 ELECTROCARDIOGRAM TRACING: CPT

## 2024-01-18 RX ORDER — KETOROLAC TROMETHAMINE 15 MG/ML
15 INJECTION, SOLUTION INTRAMUSCULAR; INTRAVENOUS ONCE
Status: COMPLETED | OUTPATIENT
Start: 2024-01-18 | End: 2024-01-18

## 2024-01-18 RX ORDER — SODIUM CHLORIDE 9 MG/ML
INJECTION, SOLUTION INTRAVENOUS CONTINUOUS
Status: DISCONTINUED | OUTPATIENT
Start: 2024-01-18 | End: 2024-01-18

## 2024-01-18 RX ORDER — FAMOTIDINE 10 MG/ML
20 INJECTION, SOLUTION INTRAVENOUS ONCE
Status: COMPLETED | OUTPATIENT
Start: 2024-01-18 | End: 2024-01-18

## 2024-01-18 RX ORDER — ONDANSETRON 4 MG/1
4 TABLET, ORALLY DISINTEGRATING ORAL EVERY 4 HOURS PRN
Qty: 10 TABLET | Refills: 0 | Status: SHIPPED | OUTPATIENT
Start: 2024-01-18 | End: 2024-01-25

## 2024-01-18 RX ORDER — ONDANSETRON 2 MG/ML
4 INJECTION INTRAMUSCULAR; INTRAVENOUS ONCE
Status: COMPLETED | OUTPATIENT
Start: 2024-01-18 | End: 2024-01-18

## 2024-01-18 NOTE — ED PROVIDER NOTES
Patient Seen in: [facility]      History     Chief Complaint   Patient presents with    Abdomen/Flank Pain     Stated Complaint: RUQ pain +N/V denies diarrhea or fever. hx cholecystecomy    Subjective:   HPI    Patient here for nausea vomiting and diarrhea that started yesterday.  Subjective fevers coming and going.    Diarrhea twice nonbloody.  Vomiting nonbloody nonbilious.    Vomit about 9 times last 24 hours.  Started having the right upper quadrant pain after that.  Of note she status post cholecystectomy remotely.  No chest pain or shortness of breath no PND no orthopnea denies any history of VTE    Objective:   Past Medical History:   Diagnosis Date    Abnormal Pap smear of cervix     Anemia     Asthma     Back pain     Back problem     Cervical high risk HPV (human papillomavirus) test positive 2015    Chronic rhinitis     Depression     Diabetes (HCC)     Disorder of thyroid     Fatty liver 2019    Graves disease     High blood pressure     High cholesterol     History of COVID-19 2022    symptoms: sore throat, cough, fever; persistent cough; not hospitalized    Obesity, unspecified     Obstructive sleep apnea (adult) (pediatric) Ed 16    AHI 14 SaO2 selma 68%    Osteoarthritis     bilateral knees    Pneumonia, organism unspecified(486)     Spinal stenosis     Stroke (HCC)     TIA    Thyroid disease     TIA (transient ischemic attack)     Visual impairment     reading glasses              Past Surgical History:   Procedure Laterality Date    BENIGN BIOPSY LEFT  2017          CHOLECYSTECTOMY      COLPOSCOPY, CERVIX W/UPPER ADJACENT VAGINA; W/BIOPSY(S), CERVIX      SHAMEKA 1    EXCIS LUMBAR DISK,ONE LEVEL      REMOVAL GALLBLADDER                  Social History     Socioeconomic History    Marital status: Single   Tobacco Use    Smoking status: Former     Packs/day: 0.30     Years: 5.00     Additional pack years: 0.00     Total pack  years: 1.50     Types: Cigarettes     Start date: 1991     Quit date: 2000     Years since quittin.7    Smokeless tobacco: Never    Tobacco comments:     social smoker   Vaping Use    Vaping Use: Never used   Substance and Sexual Activity    Alcohol use: Yes     Alcohol/week: 0.0 - 1.0 standard drinks of alcohol     Comment: Socially    Drug use: No    Sexual activity: Yes     Partners: Male     Birth control/protection: I.U.D.              Review of Systems    Positive for stated complaint: RUQ pain +N/V denies diarrhea or fever. hx cholecystecomy  Other systems are as noted in HPI.  Constitutional and vital signs reviewed.      All other systems reviewed and negative except as noted above.    Physical Exam     ED Triage Vitals [24 1124]   /85   Pulse 109   Resp 20   Temp 98 °F (36.7 °C)   Temp src Temporal   SpO2 98 %   O2 Device None (Room air)       Current:/85   Pulse 96   Temp 98 °F (36.7 °C) (Temporal)   Resp 14   Ht 157.5 cm (5' 2\")   Wt 80.7 kg   LMP 2023 (Approximate)   SpO2 100%   BMI 32.56 kg/m²         Physical Exam      Constitutional: Awake, alert, age appearing, non-toxic  Head: Normocephalic and atraumatic.     Eyes: EOM are normal. Pupils are equal, round, and reactive to light.   Neck: Normal range of motion. Neck supple. No JVD present.     Cardiovascular: Normal rate and regular rhythm.  Normal peripheral perfusion with good color.  Pulmonary/Chest: Normal effort.  No accessory muscle use.  No clubbing, no cyanosis.  Abdominal: Soft. There is no tenderness. There is no guarding.     Musculoskeletal: No swelling, deformity or ecchymosis.    Neurological: Pt is alert and oriented to person, place, and time. No cranial nerve deficit.     Skin: Skin is warm and dry.   Psychiatric: Normal mood and affect. Thought content normal.         Belly clinically benign multiple exams    ED Course     Labs Reviewed   COMP METABOLIC PANEL (14) - Abnormal; Notable  for the following components:       Result Value    Glucose 127 (*)     All other components within normal limits   CBC W/ DIFFERENTIAL - Abnormal; Notable for the following components:    Lymphocyte Absolute 0.75 (*)     All other components within normal limits   LIPASE - Normal   POCT PREGNANCY URINE - Normal   CBC WITH DIFFERENTIAL WITH PLATELET    Narrative:     The following orders were created for panel order CBC With Differential With Platelet.  Procedure                               Abnormality         Status                     ---------                               -----------         ------                     CBC W/ DIFFERENTIAL[[id number]]          Abnormal            Final result                 Please view results for these tests on the individual orders.   URINALYSIS WITH CULTURE REFLEX     EKG    Rate, intervals and axes as noted on EKG Report.  Rate: 100    Rhythm: Sinus Rhythm  Reading: Sinus rhythm without acute ischemia                 Blood work reviewed, overall reassuring CBC CMP lipase fine UA clean         MDM          Differential diagnoses considered: Favoring gastroenteritis but potentially life-threatening etiologies of her symptoms were considered including perforated viscus which is a surgical emergency.      -Nausea vomiting and diarrhea, all nonbloody suggest infectious gastroenteritis  -Her bili is clinical benign multiple exams.  Though she does have some right upper quadrant pain, she is status post cholecystectomy rolling this started after vomiting.  Suspect this to be abdominal wall issue.    -Plan to discharge home with supportive care      *My independent interpretation of radiographs: Ultrasound was considered but the patient's belly is benign and she is status post cholecystectomy thus I believe this will be a low utility test      *Prescription for Zofran sent to the pharmacy    *Discussion of ongoing management of this patient's care included: n/a  *Comorbidities  contributing to the complexity of decision making: n/a  *External charts reviewed: n/a  *Additional sources of history: n/a    Shared decision making was done by: patient, myself.                                     Medical Decision Making      Disposition and Plan     Clinical Impression:  1. Nausea vomiting and diarrhea         Disposition:  Discharge  1/18/2024 12:53 pm    Follow-up:  Greer Edmondson MD  [address] [place] [id number]  [phone]    Follow up            Medications Prescribed:  Current Discharge Medication List        START taking these medications    Details   ondansetron 4 MG Oral Tablet Dispersible Take 1 tablet (4 mg total) by mouth every 4 (four) hours as needed for Nausea.  Qty: 10 tablet, Refills: 0

## 2024-05-21 LAB
POCT INFLUENZA A: NEGATIVE
POCT INFLUENZA B: NEGATIVE
SARS-COV-2 RNA RESP QL NAA+PROBE: NOT DETECTED

## 2024-05-21 PROCEDURE — 99284 EMERGENCY DEPT VISIT MOD MDM: CPT

## 2024-05-21 PROCEDURE — 87430 STREP A AG IA: CPT | Performed by: EMERGENCY MEDICINE

## 2024-05-21 PROCEDURE — 99283 EMERGENCY DEPT VISIT LOW MDM: CPT

## 2024-05-21 PROCEDURE — 87502 INFLUENZA DNA AMP PROBE: CPT | Performed by: EMERGENCY MEDICINE

## 2024-05-22 ENCOUNTER — APPOINTMENT (OUTPATIENT)
Dept: GENERAL RADIOLOGY | Age: 54
End: 2024-05-22
Attending: EMERGENCY MEDICINE

## 2024-05-22 ENCOUNTER — HOSPITAL ENCOUNTER (EMERGENCY)
Age: 54
Discharge: HOME | End: 2024-05-22
Attending: EMERGENCY MEDICINE

## 2024-05-22 VITALS
HEART RATE: 72 BPM | SYSTOLIC BLOOD PRESSURE: 125 MMHG | DIASTOLIC BLOOD PRESSURE: 78 MMHG | WEIGHT: 178 LBS | HEIGHT: 62 IN | BODY MASS INDEX: 32.76 KG/M2 | OXYGEN SATURATION: 98 % | TEMPERATURE: 98 F | RESPIRATION RATE: 16 BRPM

## 2024-05-22 DIAGNOSIS — K13.70 ORAL LESION: Primary | ICD-10-CM

## 2024-05-22 DIAGNOSIS — H65.193 ACUTE MEE (MIDDLE EAR EFFUSION), BILATERAL: ICD-10-CM

## 2024-05-22 DIAGNOSIS — B34.9 VIRAL SYNDROME: ICD-10-CM

## 2024-05-22 DIAGNOSIS — B00.1 FEVER BLISTER: ICD-10-CM

## 2024-05-22 DIAGNOSIS — R59.1 LAD (LYMPHADENOPATHY): ICD-10-CM

## 2024-05-22 PROCEDURE — 71045 X-RAY EXAM CHEST 1 VIEW: CPT | Performed by: EMERGENCY MEDICINE

## 2024-05-22 RX ORDER — SUCRALFATE 1 G/1
1 TABLET ORAL
Qty: 120 TABLET | Refills: 0 | Status: SHIPPED | OUTPATIENT
Start: 2024-05-22

## 2024-05-22 RX ORDER — FLUTICASONE PROPIONATE 50 MCG
2 SPRAY, SUSPENSION (ML) NASAL DAILY
Qty: 16 G | Refills: 0 | Status: SHIPPED | OUTPATIENT
Start: 2024-05-22

## 2024-05-22 NOTE — ED PROVIDER NOTES
Patient Seen in: [facility] In [place]      History     Chief Complaint   Patient presents with    Fever     Stated Complaint: fevers, swollen lymph nodes and mouth sores    Subjective:   Patient is a 54-year-old diabetic who presents emergency room for feeling of swelling around her lymph nodes on her sock lateral region.  Patient reports that she has some oral lesions on exam patient has clear blisters that are present.  There is no surrounding cellulitis.  Patient also has a feeling of fullness in her ears bilaterally on exam there is fluid behind both ears but no sign of otitis media.  Will recommend Flonase nasal spray steroid and anti-inflammatories and alternate between Tylenol Motrin for fever control.  Likely viral illness.  Will recommend Carafate to be used as well topically to the oral lesions.  Patient reports that she is concerned because her brother recently had toe amputation due to his diabetes.  There is due to patient history of diabetes.  Patient reports that she has been having fevers.  Her last dose of Tylenol or Motrin was more than 4 hours ago she is afebrile here    The history is provided by the patient.           Objective:   Past Medical History:    Abnormal Pap smear of cervix    Anemia    Asthma (HCC)    Back pain    Back problem    Cervical high risk HPV (human papillomavirus) test positive    Chronic rhinitis    Depression    Diabetes (HCC)    Disorder of thyroid    Fatty liver    Graves disease    High blood pressure    High cholesterol    History of COVID-19    symptoms: sore throat, cough, fever; persistent cough; not hospitalized    Obesity, unspecified    Obstructive sleep apnea (adult) (pediatric)    AHI 14 SaO2 selma 68%    Osteoarthritis    bilateral knees    Pneumonia, organism unspecified(486)    Spinal stenosis    Stroke (HCC)    TIA    Thyroid disease    TIA (transient ischemic attack)    Visual impairment    reading glasses              Past Surgical  History:   Procedure Laterality Date    Benign biopsy left  2017          Cholecystectomy      Colposcopy, cervix w/upper adjacent vagina; w/biopsy(s), cervix      SHAMEKA 1    Excis lumbar disk,one level      Removal gallbladder                  Social History     Socioeconomic History    Marital status: Single   Tobacco Use    Smoking status: Former     Current packs/day: 0.00     Average packs/day: 0.3 packs/day for 9.3 years (2.8 ttl pk-yrs)     Types: Cigarettes     Start date: 1991     Quit date: 2000     Years since quittin.0    Smokeless tobacco: Never    Tobacco comments:     social smoker   Vaping Use    Vaping status: Never Used   Substance and Sexual Activity    Alcohol use: Yes     Alcohol/week: 0.0 - 1.0 standard drinks of alcohol     Comment: Socially    Drug use: No    Sexual activity: Yes     Partners: Male     Birth control/protection: I.U.D.     Social Determinants of Health     Food Insecurity: No Food Insecurity (2024)    Received from [facility]    Food Insecurity     Within the past 30 days, I worried whether my food would run out before I got money to buy more. / En los últimos 30 días, me preocupó que la comida se podía acabar antes de tener dinero para compr...: Never true / Nunca     Within the past 30 days, the food that I bought just didn't last, and I didn't have money to get more. / En los últimos 30 días, La comida que compré no rindió lo suficiente, y no tenía dinero para...: Never true / Nunca              Review of Systems   Constitutional:  Positive for fever.   HENT:  Positive for ear pain.         Lesions orally   Hematological:  Positive for adenopathy.       Positive for stated complaint: fevers, swollen lymph nodes and mouth sores  Other systems are as noted in HPI.  Constitutional and vital signs reviewed.      All other systems reviewed and negative except as noted above.    Physical Exam     ED Triage Vitals [24  2221]   /82   Pulse 80   Resp 16   Temp 98.8 °F (37.1 °C)   Temp src Temporal   SpO2 97 %   O2 Device None (Room air)       Current Vitals:   Vital Signs  BP: 125/78  Pulse: 72  Resp: 16  Temp: 98.3 °F (36.8 °C)  Temp src: Oral    Oxygen Therapy  SpO2: 98 %  O2 Device: None (Room air)            Physical Exam  Vitals and nursing note reviewed.   Constitutional:       General: She is not in acute distress.     Appearance: Normal appearance. She is normal weight. She is not toxic-appearing.   HENT:      Head: Normocephalic and atraumatic.      Right Ear: Tympanic membrane normal.      Left Ear: Tympanic membrane normal.      Ears:      Comments: Behind both ears clear fluid     Nose: Nose normal.      Mouth/Throat:      Mouth: Mucous membranes are moist.      Comments: Canker sores noted  Eyes:      Extraocular Movements: Extraocular movements intact.      Pupils: Pupils are equal, round, and reactive to light.   Cardiovascular:      Rate and Rhythm: Normal rate.      Pulses: Normal pulses.   Pulmonary:      Effort: Pulmonary effort is normal.   Abdominal:      General: There is no distension.      Palpations: Abdomen is soft.   Musculoskeletal:         General: Signs of injury present. No swelling or deformity. Normal range of motion.   Lymphadenopathy:      Cervical: Cervical adenopathy present.   Skin:     General: Skin is warm.      Capillary Refill: Capillary refill takes less than 2 seconds.      Comments: Old burn rodrigue on right arm healing patient has Band-Aid over the area   Neurological:      General: No focal deficit present.      Mental Status: She is alert and oriented to person, place, and time.   Psychiatric:         Mood and Affect: Mood normal.               ED Course     Labs Reviewed   POCT FLU TEST - Normal    Narrative:     This assay is a rapid molecular in vitro test utilizing nucleic acid amplification of influenza A and B viral RNA.   RAPID SARS-COV-2 BY PCR - Normal   RAPID STREP A  SCREEN (LC) - Normal    Narrative:     A confirmatory culture is recommended if clinically indicated.             Chest x-ray shows no focal pulmonary consolidation no pleural effusion or pneumothorax normal cardiomediastinal silhouette no acute osseous abnormality         MDM      Social -negative tobacco, negative etoh, negative drugs  Family History-history diabetes  Past Medical History-  depression anemia diabetes asthma, high blood pressure, fatty liver    Differential diagnosis before testing included canker sore, oral lesions, stomatitis, viral syndrome, lymphadenopathy, ear effusion, ear infection, viral syndrome, pneumonia    Co-morbidities that add to the complexity of management include: Diabetes    Testing ordered during this visit included swabs for COVID and flu and strep and chest x-ray    Radiographic images  I personally reviewed the radiographs and my individual interpretation shows no acute process  I also reviewed the official reports that showed no focal pulmonary consolidation pleural effusion or pneumothorax normal cardiomediastinal silhouette.  No acute osseous abnormality    External chart review showed review of care everywhere in epic system shows no related comorbidities to current presentation    History obtained by an independent source included from patient patient    Discussion of management with patient    Social determinants of health that affect care include patient      Medications Provided: Carafate, Flonase nasal spray patient alternate Tylenol Motrin    Course of Events during Emergency Room Visit include 54-year-old female who presents emergency room with URI symptoms and canker sores.  Likely viral in nature and will recommend no antibiotics will use topical Carafate patient informed of how to use the Carafate.  Patient will not be used steroids due to the history of diabetes recommend Tylenol Motrin for anti-inflammatory processes.  Will recommend Flonase nasal.  For nasal  decongestion.  Patient follow-up with primary care physician          Disposition:          Discharge  I have discussed with the patient the results of test, differential diagnosis, treatment plan, warning signs and symptoms which should prompt immediate return.  They expressed understanding of these instructions and agrees to the following plan provided.  They were given written discharge instructions and agrees to return for any concerns and voiced understanding and all questions were answered.                                      Medical Decision Making      Disposition and Plan     Clinical Impression:  1. Oral lesion    2. Fever blister    3. LAD (lymphadenopathy)    4. Acute JEAN CARLOS (middle ear effusion), bilateral    5. Viral syndrome         Disposition:  Discharge  5/22/2024  2:12 am    Follow-up:  Greer Edmondson MD  [address] [address] [address]  [phone]    Schedule an appointment as soon as possible for a visit            Medications Prescribed:  Discharge Medication List as of 5/22/2024  2:21 AM        START taking these medications    Details   sucralfate 1 g Oral Tab Take 1 tablet (1 g total) by mouth 4 (four) times daily before meals and nightly., Normal, Disp-120 tablet, R-0      fluticasone propionate 50 MCG/ACT Nasal Suspension 2 sprays by Nasal route daily., Normal, Disp-16 g, R-0

## 2024-05-22 NOTE — ED INITIAL ASSESSMENT (HPI)
Pt states she has had a fever since Friday. Feels like the lymph nodes in her neck are swollen. Also has painful sores in mouth.

## 2024-12-30 ENCOUNTER — HOSPITAL ENCOUNTER (EMERGENCY)
Age: 54
Discharge: HOME | End: 2024-12-31
Attending: EMERGENCY MEDICINE
Payer: COMMERCIAL

## 2024-12-30 ENCOUNTER — APPOINTMENT (OUTPATIENT)
Dept: GENERAL RADIOLOGY | Age: 54
End: 2024-12-30
Attending: EMERGENCY MEDICINE
Payer: COMMERCIAL

## 2024-12-30 ENCOUNTER — APPOINTMENT (OUTPATIENT)
Dept: CT IMAGING | Age: 54
End: 2024-12-30
Attending: EMERGENCY MEDICINE
Payer: COMMERCIAL

## 2024-12-30 VITALS
WEIGHT: 172 LBS | RESPIRATION RATE: 18 BRPM | BODY MASS INDEX: 31.65 KG/M2 | OXYGEN SATURATION: 100 % | HEART RATE: 75 BPM | DIASTOLIC BLOOD PRESSURE: 79 MMHG | TEMPERATURE: 97 F | SYSTOLIC BLOOD PRESSURE: 134 MMHG | HEIGHT: 62 IN

## 2024-12-30 DIAGNOSIS — R10.9 ABDOMINAL PAIN OF UNKNOWN ETIOLOGY: Primary | ICD-10-CM

## 2024-12-30 DIAGNOSIS — R07.9 CHEST PAIN WITH LOW RISK FOR CARDIAC ETIOLOGY: ICD-10-CM

## 2024-12-30 LAB
ALBUMIN SERPL-MCNC: 4.3 G/DL (ref 3.2–4.8)
ALBUMIN/GLOB SERPL: 1.7 {RATIO} (ref 1–2)
ALP LIVER SERPL-CCNC: 109 U/L
ALT SERPL-CCNC: 23 U/L
ANION GAP SERPL CALC-SCNC: 7 MMOL/L (ref 0–18)
AST SERPL-CCNC: 18 U/L (ref ?–34)
BASOPHILS # BLD AUTO: 0.05 X10(3) UL (ref 0–0.2)
BASOPHILS NFR BLD AUTO: 0.9 %
BILIRUB SERPL-MCNC: 0.8 MG/DL (ref 0.3–1.2)
BUN BLD-MCNC: 16 MG/DL (ref 9–23)
CALCIUM BLD-MCNC: 9.5 MG/DL (ref 8.7–10.4)
CHLORIDE SERPL-SCNC: 108 MMOL/L (ref 98–112)
CO2 SERPL-SCNC: 25 MMOL/L (ref 21–32)
CREAT BLD-MCNC: 0.8 MG/DL
EGFRCR SERPLBLD CKD-EPI 2021: 88 ML/MIN/1.73M2 (ref 60–?)
EOSINOPHIL # BLD AUTO: 0.11 X10(3) UL (ref 0–0.7)
EOSINOPHIL NFR BLD AUTO: 1.9 %
ERYTHROCYTE [DISTWIDTH] IN BLOOD BY AUTOMATED COUNT: 13.2 %
GLOBULIN PLAS-MCNC: 2.6 G/DL (ref 2–3.5)
GLUCOSE BLD-MCNC: 106 MG/DL (ref 70–99)
HCT VFR BLD AUTO: 34.5 %
HGB BLD-MCNC: 12 G/DL
IMM GRANULOCYTES # BLD AUTO: 0.01 X10(3) UL (ref 0–1)
IMM GRANULOCYTES NFR BLD: 0.2 %
LIPASE SERPL-CCNC: 46 U/L (ref 12–53)
LYMPHOCYTES # BLD AUTO: 2.8 X10(3) UL (ref 1–4)
LYMPHOCYTES NFR BLD AUTO: 48.5 %
MCH RBC QN AUTO: 29.2 PG (ref 26–34)
MCHC RBC AUTO-ENTMCNC: 34.8 G/DL (ref 31–37)
MCV RBC AUTO: 83.9 FL
MONOCYTES # BLD AUTO: 0.42 X10(3) UL (ref 0.1–1)
MONOCYTES NFR BLD AUTO: 7.3 %
NEUTROPHILS # BLD AUTO: 2.38 X10 (3) UL (ref 1.5–7.7)
NEUTROPHILS # BLD AUTO: 2.38 X10(3) UL (ref 1.5–7.7)
NEUTROPHILS NFR BLD AUTO: 41.2 %
OSMOLALITY SERPL CALC.SUM OF ELEC: 292 MOSM/KG (ref 275–295)
PLATELET # BLD AUTO: 202 10(3)UL (ref 150–450)
POTASSIUM SERPL-SCNC: 3.6 MMOL/L (ref 3.5–5.1)
PROT SERPL-MCNC: 6.9 G/DL (ref 5.7–8.2)
RBC # BLD AUTO: 4.11 X10(6)UL
SODIUM SERPL-SCNC: 140 MMOL/L (ref 136–145)
TROPONIN I SERPL HS-MCNC: <3 NG/L
WBC # BLD AUTO: 5.8 X10(3) UL (ref 4–11)

## 2024-12-30 PROCEDURE — 36415 COLL VENOUS BLD VENIPUNCTURE: CPT

## 2024-12-30 PROCEDURE — 84484 ASSAY OF TROPONIN QUANT: CPT | Performed by: EMERGENCY MEDICINE

## 2024-12-30 PROCEDURE — 99285 EMERGENCY DEPT VISIT HI MDM: CPT

## 2024-12-30 PROCEDURE — 85025 COMPLETE CBC W/AUTO DIFF WBC: CPT | Performed by: EMERGENCY MEDICINE

## 2024-12-30 PROCEDURE — 71045 X-RAY EXAM CHEST 1 VIEW: CPT | Performed by: EMERGENCY MEDICINE

## 2024-12-30 PROCEDURE — 93005 ELECTROCARDIOGRAM TRACING: CPT

## 2024-12-30 PROCEDURE — 80053 COMPREHEN METABOLIC PANEL: CPT | Performed by: EMERGENCY MEDICINE

## 2024-12-30 PROCEDURE — 83690 ASSAY OF LIPASE: CPT | Performed by: EMERGENCY MEDICINE

## 2024-12-30 PROCEDURE — 93010 ELECTROCARDIOGRAM REPORT: CPT

## 2024-12-30 PROCEDURE — 74177 CT ABD & PELVIS W/CONTRAST: CPT | Performed by: EMERGENCY MEDICINE

## 2024-12-30 RX ORDER — IOPAMIDOL 755 MG/ML
100 INJECTION, SOLUTION INTRAVASCULAR
Status: COMPLETED | OUTPATIENT
Start: 2024-12-30 | End: 2024-12-30

## 2024-12-31 LAB
ATRIAL RATE: 72 BPM
P AXIS: 41 DEGREES
P-R INTERVAL: 174 MS
Q-T INTERVAL: 398 MS
QRS DURATION: 78 MS
QTC CALCULATION (BEZET): 435 MS
R AXIS: -11 DEGREES
T AXIS: 42 DEGREES
VENTRICULAR RATE: 72 BPM

## 2024-12-31 RX ORDER — ONDANSETRON 4 MG/1
4 TABLET, ORALLY DISINTEGRATING ORAL EVERY 4 HOURS PRN
Qty: 10 TABLET | Refills: 0 | Status: SHIPPED | OUTPATIENT
Start: 2024-12-31 | End: 2025-01-07

## 2024-12-31 RX ORDER — IBUPROFEN 600 MG/1
600 TABLET, FILM COATED ORAL EVERY 8 HOURS PRN
Qty: 30 TABLET | Refills: 0 | Status: SHIPPED | OUTPATIENT
Start: 2024-12-31 | End: 2025-01-10

## 2024-12-31 NOTE — ED INITIAL ASSESSMENT (HPI)
Right arm numbness increasing throughout the day today. Chest tightness for one day. RLQ abdominal pain x 10 days

## 2024-12-31 NOTE — ED PROVIDER NOTES
Patient Seen in: [facility] In [place]      History     Chief Complaint   Patient presents with    Chest Pain Angina    Abdomen/Flank Pain     Stated Complaint: chest tightness, abdominal pain, right arm numbness    Subjective:   HPI      54-year-old female reports sharp pain on the right side of her abdomen that initially came and went but has been constant since Saturday, 2024. She also experienced a little diarrhea on  morning, 2024, after the pain became constant. Denies fever but mentioned feeling slightly worn on  after going shopping. 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 Friedman, every six months. Reports chest tightness that began at approximately 4 a.m. today, 2024, and a similar episode of chest tightness last Saturday while driving. Denies nausea or lack of appetite. Reports bowel movements as irregular (\"here and there\"). Confirms she still has her appendix    Objective:     Past Medical History:    Abnormal Pap smear of cervix    Anemia    Asthma (HCC)    Back pain    Back problem    Cervical high risk HPV (human papillomavirus) test positive    Chronic rhinitis    Depression    Diabetes (HCC)    Disorder of thyroid    Fatty liver    Graves disease    High blood pressure    High cholesterol    History of COVID-19    symptoms: sore throat, cough, fever; persistent cough; not hospitalized    Obesity, unspecified    Obstructive sleep apnea (adult) (pediatric)    AHI 14 SaO2 selma 68%    Osteoarthritis    bilateral knees    Pneumonia, organism unspecified(486)    Spinal stenosis    Stroke (HCC)    TIA    Thyroid disease    TIA (transient ischemic attack)    Visual impairment    reading glasses              Past Surgical History:   Procedure Laterality Date    Benign biopsy left  2017          Cholecystectomy      Colposcopy,  cervix w/upper adjacent vagina; w/biopsy(s), cervix      SHAMEKA 1    Excis lumbar disk,one level      Removal gallbladder                  Social History     Socioeconomic History    Marital status: Single   Tobacco Use    Smoking status: Former     Current packs/day: 0.00     Average packs/day: 0.3 packs/day for 9.3 years (2.8 ttl pk-yrs)     Types: Cigarettes     Start date: 1991     Quit date: 2000     Years since quittin.6    Smokeless tobacco: Never    Tobacco comments:     social smoker   Vaping Use    Vaping status: Never Used   Substance and Sexual Activity    Alcohol use: Yes     Alcohol/week: 0.0 - 1.0 standard drinks of alcohol     Comment: Socially    Drug use: No    Sexual activity: Yes     Partners: Male     Birth control/protection: I.U.D.     Social Drivers of Health     Food Insecurity: No Food Insecurity (2024)    Received from [facility]    Food Insecurity     Within the past 30 days, I worried whether my food would run out before I got money to buy more. / En los últimos 30 días, me preocupó que la comida se podía acabar antes de tener dinero para compr...: Never true / Nunca     Within the past 30 days, the food that I bought just didn't last, and I didn't have money to get more. / En los últimos 30 días, La comida que compré no rindió lo suficiente, y no tenía dinero para...: Never true / Nunca                  Physical Exam     ED Triage Vitals [24 2155]   /79   Pulse 75   Resp 18   Temp 97.4 °F (36.3 °C)   Temp src Temporal   SpO2 100 %   O2 Device None (Room air)       Current Vitals:   Vital Signs  BP: 134/79  Pulse: 75  Resp: 18  Temp: 97.4 °F (36.3 °C)  Temp src: Temporal    Oxygen Therapy  SpO2: 100 %  O2 Device: None (Room air)        Physical Exam    Vital signs reviewed  General appearance: Patient is alert and in no acute distress  HEENT: Pupils equal react to light extraocular muscles intact no scleral icterus, mucous membranes  are moist, there is no erythema or exudate in the posterior pharynx  Neck: Supple no JVD no lymphadenopathy no meningismus no carotid bruit  CV: Regular rate and rhythm no murmur rub  Respiratory: Clear to auscultation bilaterally no crackles no wheezes no accessory muscle use  Abdomen: Right lower quadrant pain no rebound no guarding  no hepatosplenomegaly bowel sounds are present , no pulsatile mass  Extremities: No clubbing cyanosis or edema 2+ distal pulses.  Neuro: Cranial nerves II through XII intact with no gross focal sensory or motor abnormality.      ED Course     Labs Reviewed   COMP METABOLIC PANEL (14) - Abnormal; Notable for the following components:       Result Value    Glucose 106 (*)     Alkaline Phosphatase 109 (*)     All other components within normal limits   CBC WITH DIFFERENTIAL WITH PLATELET - Abnormal; Notable for the following components:    HCT 34.5 (*)     All other components within normal limits   LIPASE - Normal   TROPONIN I HIGH SENSITIVITY - Normal     EKG    Rate, intervals and axes as noted on EKG Report.  Rate: 72  Rhythm: Sinus Rhythm  Reading: Normal sinus rhythm                Patient was evaluated had a CBC chemistry lipase and a troponin along with a chest x-ray to get a CT scan to assess for appendicitis.  As far as her chest pain I do feel low yield as she has an absolutely normal EKG except for a little low voltage but unchanged from prior except for little rightward axis shift     CT ABDOMEN+PELVIS(CONTRAST ONLY)(CPT=74177)    Result Date: 12/30/2024  CONCLUSION:  No acute intra-abdominal/pelvic process identified.   Please see above for further details.  LOCATION:  [facility]   Dictated by (CST): Jeremiah Yuan MD on 12/30/2024 at 11:41 PM     Finalized by (CST): Jeremiah Yuan MD on 12/30/2024 at 11:43 PM       XR CHEST AP PORTABLE  (CPT=71045)    Result Date: 12/30/2024  CONCLUSION:  No active cardiopulmonary process identified.   LOCATION:  [facility]      Dictated by  (CST): Jeremiah Yuan MD on 12/30/2024 at 11:10 PM     Finalized by (CST): Jeremiah Yuan MD on 12/30/2024 at 11:10 PM          CT scan was unremarkable.  Chest x-ray was also unremarkable.  No identifiable cause of her abdominal pain.  Will have patient be discharged with some anti-inflammatories and some nausea medication follow-up with her primary return if any worsening symptoms.  MDM      Differential diagnosis reflecting the complexity of care include: Cardiac angina, costochondritis, colitis, gastroenteritis, appendicitis    Comorbidities that add complexity to management include: History of myocarditis in the past and is followed up with Dr. Mendoza      My independent interpretation of studies of: CT scan was done and showed no intra-abdominal inflammation or obstruction.  Chest x-ray also showed no consolidation or effusion      Shared decision making was done by myself and patient and family.  Patient will be discharged home with some anti-inflammatories and some Zofran.  Follow-up with her primary if chest pain returns or any significant pressure she should return for reevaluation told her to follow-up with her cardiologist and she agrees.  Do not find anything for admission and no sign of appendicitis                Medical Decision Making      Disposition and Plan     Clinical Impression:  1. Abdominal pain of unknown etiology    2. Chest pain with low risk for cardiac etiology         Disposition:  Discharge  12/31/2024 12:06 am    Follow-up:  Greer Edmondson MD  [address] [address] [address]  [phone]    Follow up            Medications Prescribed:  Discharge Medication List as of 12/31/2024 12:07 AM        START taking these medications    Details   ibuprofen 600 MG Oral Tab Take 1 tablet (600 mg total) by mouth every 8 (eight) hours as needed for Pain., Normal, Disp-30 tablet, R-0      ondansetron 4 MG Oral Tablet Dispersible Take 1 tablet (4 mg total) by mouth every 4 (four) hours as  needed for Nausea., Normal, Disp-10 tablet, R-0                 Supplementary Documentation:    Patient was screened and evaluated during this visit.  As the treating physician attending to the patient, I determined within reasonable clinical confidence and prior to discharge, that an emergency medical condition was not or was no longer present.  There was no indication for further evaluation, treatment, or admission on an emergency basis.  Comprehensive verbal and written discharge and follow-up instructions were provided to help prevent relapse or worsening.  Patient was instructed to follow-up with primary care provider for further evaluation treatment, return immediately to ER for worsening, concerning, new, or changing/persisting symptoms.  I discussed the case with the patient and they had no questions, complaints, or concerns.  Patient was comfortable going home.      Dictation Disclaimer Note:   To increase efficiency this document may have been prepared using voice recognition technology. Every effort has been made to correct any errors made during preparation of this note. However, if a word or phrase is confusing, or does not make sense, this is likely due to a recognition error within the program which was not discovered during editing. Please do not hesitate to contact to address any significant errors.    Note to Patient:   The 21st Century Cures Act makes medical notes like these available to patients in the interest of transparency. Please be advised this is a medical document. Medical documents are intended to carry relevant information, facts as evident, and the clinical opinion of the practitioner. The medical note is intended as peer to peer communication and may appear blunt or direct. It is written in medical language and may contain abbreviations or verbiage that are unfamiliar.

## (undated) DEVICE — RELINE MAS BLADE FASCIAL SPLIT

## (undated) DEVICE — STERILE POLYISOPRENE POWDER-FREE SURGICAL GLOVES: Brand: PROTEXIS

## (undated) DEVICE — Device: Brand: INTELLICART™

## (undated) DEVICE — GRAFT DELIVERY SYS MODULE

## (undated) DEVICE — GLOVE SURG SENSICARE SZ 7-1/2

## (undated) DEVICE — E-Z BUTTON SWITCH PENCIL

## (undated) DEVICE — COVER,TABLE,44X90,STERILE: Brand: MEDLINE

## (undated) DEVICE — UNDYED BRAIDED (POLYGLACTIN 910), SYNTHETIC ABSORBABLE SUTURE: Brand: COATED VICRYL

## (undated) DEVICE — SUT VICRYL 2-0 FSL J589H

## (undated) DEVICE — SUT VICRYL 1 OS-6 J535H

## (undated) DEVICE — 450 ML BOTTLE OF 0.05% CHLORHEXIDINE GLUCONATE IN 99.95% STERILE WATER FOR IRRIGATION, USP AND APPLICATOR.: Brand: IRRISEPT ANTIMICROBIAL WOUND LAVAGE

## (undated) DEVICE — DERMA+FLEX TISSUE ADHESIVE

## (undated) DEVICE — SLEEVE KENDALL SCD EXPRESS MED

## (undated) DEVICE — DRAPE SURG 18X24

## (undated) DEVICE — KIT POSITIONING PROAXIS PRONEV

## (undated) DEVICE — SUT VICRYL 0 CT-1 J740D

## (undated) DEVICE — C-ARMOR C-ARM EQUIPMENT COVERS CLEAR STERILE UNIVERSAL FIT 12 PER CASE: Brand: C-ARMOR

## (undated) DEVICE — MAXCESS MAS TLIF 2 KIT ACCESS

## (undated) DEVICE — C-ARM: Brand: UNBRANDED

## (undated) DEVICE — 3M™ TEGADERM™ TRANSPARENT FILM DRESSING, 1626W, 4 IN X 4-3/4 IN (10 CM X 12 CM), 50 EACH/CARTON, 4 CARTON/CASE: Brand: 3M™ TEGADERM™

## (undated) DEVICE — 3M(TM) TEGADERM(TM) TRANSPARENT FILM DRESSING FRAME STYLE 1628: Brand: 3M™ TEGADERM™

## (undated) DEVICE — SOL NACL IRRIG 0.9% 1000ML BTL

## (undated) DEVICE — NV I-PAS III DIAMOND TIP

## (undated) DEVICE — WIRE FX PRCPT KRSH BVL BLNT

## (undated) DEVICE — LAMINECTOMY CDS: Brand: MEDLINE INDUSTRIES, INC.

## (undated) DEVICE — 3.0MM PRECISION ROUND

## (undated) DEVICE — LIGHT HANDLE

## (undated) DEVICE — FLOSEAL WITH RECOTHROM - 5ML: Brand: FLOSEAL HEMOSTATIC MATRIX

## (undated) DEVICE — RELINE POWER

## (undated) DEVICE — PEN SKIN MARKING REG TIP VIOLT

## (undated) DEVICE — SUT VICRYL 2-0 CT-2 J726D

## (undated) DEVICE — WRAP THERAPEUTIC BACK WO GEL P

## (undated) NOTE — ED AVS SNAPSHOT
[facility] in [address] [place] [address]    Phone:  [phone]    Fax:  [phone]           Tricia Alonso   MRN: [id number]    Department:  [facility] in [place]   Date of Visit: IF THERE IS ANY CHANGE OR WORSENING OF YOUR CONDITION, CALL YOUR PRIMARY CARE PHYSICIAN AT ONCE OR RETURN IMMEDIATELY TO THE EMERGENCY DEPARTMENT.     If you have been prescribed any medication(s), please fill your prescription right away and begin taking t

## (undated) NOTE — LETTER
January 23, 2017    Patient: Destiny Carey   Date of Visit: 1/23/2017       To Whom It May Concern:    Destiny Carey was seen and treated in our emergency department on 1/23/2017. She should not return to work until 01-.     If you have any q

## (undated) NOTE — ED AVS SNAPSHOT
Jennyfer Martinez   MRN: [id number]    Department:  THE [facility] in [place]   Date of Visit:  7/27/2017           Disclosure     Insurance plans vary and the physician(s) referred by the ER may not be covered by your plan.  Please contac If you have been prescribed any medication(s), please fill your prescription right away and begin taking the medication(s) as directed    If the emergency physician has read X-rays, these will be re-interpreted by a radiologist.  If there is a significant

## (undated) NOTE — LETTER
Date & Time: 2/18/2019, 7:32 PM  Patient: Jose Trinidad  Encounter Provider(s):    Ailyn Billy MD       To Whom It May Concern:    Jose Trinidad was seen and treated in our department on 2/18/2019.  She may return to work 2-  If you have

## (undated) NOTE — LETTER
Date & Time: 12/2/2019, 3:03 PM  Patient: Lenoard Malcolm  Encounter Provider(s):    Raeann Hernandez MD  [facility], [address]       To Whom It May Concern:    Low Chawladler was seen and treated in our department on 12/2/2019.   Please allow work from

## (undated) NOTE — ED AVS SNAPSHOT
THE [facility] Department in [address] [facility]    Phone:  [phone]    Fax:  [phone]           Tsaia Loza   MRN: [id number]    Department:  THE [facility] in [place]   Date of Visit: CONTINUE taking these medications     ACCU-CHEK FASTCLIX LANCETS Misc   Quantity:  102 each   Test BID as directed            Where to Get Your Medications      You can get these medications from any pharmacy     Bring a paper prescription for each of thes return to your personal doctor) about any new or lasting problems. The primary care or specialist physician will see patients referred from the [facility] Emergency Department. Follow-up care is at the discretion of that Physician.     IF THERE IS ANY - If you have concerns related to behavioral health issues or thoughts of harming yourself, contact [address] [place] [address] at [phone].     - If you don’t have insurance, [facility] has partnered with [facility] [address] [place]

## (undated) NOTE — ED AVS SNAPSHOT
Ebony Thompson   MRN: [id number]    Department:  [facility] Emergency Department in [place]   Date of Visit:  12/2/2019           Disclosure     Insurance plans vary and the physician(s) referred by the ER may not be covered by your plan.  Please contac tell this physician (or your personal doctor if your instructions are to return to your personal doctor) about any new or lasting problems. The primary care or specialist physician will see patients referred from the [facility] Emergency Department.  Taras Colorado

## (undated) NOTE — ED AVS SNAPSHOT
Sun Bates   MRN: [id number]    Department:  [facility] Emergency Department   Date of Visit:  5/1/2018           Disclosure     Insurance plans vary and the physician(s) referred by the ER may not be covered by your plan.  Please contact your tell this physician (or your personal doctor if your instructions are to return to your personal doctor) about any new or lasting problems. The primary care or specialist physician will see patients referred from the [facility] Emergency Department.  Yudi Meyer

## (undated) NOTE — ED AVS SNAPSHOT
THE [facility] Emergency Department in [address] [facility]    Phone:  [phone]    Fax:  [phone]           Compa Antoine   MRN: [id number]    Department:  THE [facility] Emergency Department in [place]   Date of Visit: IF THERE IS ANY CHANGE OR WORSENING OF YOUR CONDITION, CALL YOUR PRIMARY CARE PHYSICIAN AT ONCE OR RETURN IMMEDIATELY TO THE EMERGENCY DEPARTMENT.     If you have been prescribed any medication(s), please fill your prescription right away and begin taking t

## (undated) NOTE — LETTER
Date & Time: 1/2/2023, 8:49 PM  Patient: Carlos Richardson  Encounter Provider(s):    Kilo Armstrong DO       To Whom It May Concern:    Carlos Richardson was seen and treated in our department on 1/2/2023. She may return to work on Wednesday January 4, 2023.     If you have any questions or concerns, please do not hesitate to call.        _____________________________  Physician/APC Signature

## (undated) NOTE — LETTER
July 28, 2017    Patient: John Mckeon   Date of Visit: 7/27/2017       To Whom It May Concern:    John Mckeon was seen and treated in our emergency department on 7/27/2017. She should not return to work until 7/31/17.     If you have any questio

## (undated) NOTE — ED AVS SNAPSHOT
Compa Antoine   MRN: [id number]    Department:  THE [facility] Emergency Department in [place]   Date of Visit:  5/10/2019           Disclosure     Insurance plans vary and the physician(s) referred by the ER may not be covered by your plan.  Please contac tell this physician (or your personal doctor if your instructions are to return to your personal doctor) about any new or lasting problems. The primary care or specialist physician will see patients referred from the [facility] Emergency Department.  Declan Santana

## (undated) NOTE — LETTER
January 23, 2017    Patient: Tammy Wall   Date of Visit: 1/23/2017       To Whom It May Concern:    Tammy Wall was seen and treated in our emergency department on 1/23/2017. She {Return to school/sport/work:[id number]}.     If you have any que

## (undated) NOTE — LETTER
Date & Time: 12/15/2021, 10:46 PM  Patient: Nyasia Lema  Encounter Provider(s): David Mera MD       To Whom It May Concern:    Nyasia Lema was seen and treated in our department on 12/15/2021.  She can return to work with these limitation

## (undated) NOTE — ED AVS SNAPSHOT
Mario Treviño   MRN: [id number]    Department:  [facility] Emergency Department   Date of Visit:  2/18/2019           Disclosure     Insurance plans vary and the physician(s) referred by the ER may not be covered by your plan.  Please contact you tell this physician (or your personal doctor if your instructions are to return to your personal doctor) about any new or lasting problems. The primary care or specialist physician will see patients referred from the [facility] Emergency Department.  Wing Morales

## (undated) NOTE — LETTER
OUTSIDE TESTING RESULT REQUEST     IMPORTANT: FOR YOUR IMMEDIATE ATTENTION  Please FAX all test results listed below to: [phone]     Testing already done on or about: 2023     * * * * If testing is NOT complete, arrange with patient A.S.A.P. * * * *      Patient Name: Anuja Agustin  Surgery Date: 2023  CSN: [id number]  Medical Record: [id number]   : 1970 - A: 47 y      Sex: female  Surgeon(s):  Desi Turpin MD  Procedure: Rhiannon Line 5 TRANSFORAMINAL LUMBAR INTERBODY FUSION WITH HARDWARE  Anesthesia Type: General     Surgeon: Desi uTrpin MD     The following Testing and Time Line are REQUIRED PER ANESTHESIA     EKG READ AND SIGNED WITHIN   90 days  CBC [with Differential & Platelets] within  90 days  CMP (requires 4 hour fast) within  90 days  PT/INR within  30 days  PTT within  30 days  UA with Reflex to Culture within  30 days  MSSA/MRSA Nasal screening within 30 days      Thank You,   Sent by: Nataly García RN

## (undated) NOTE — ED AVS SNAPSHOT
Sanchez Luna   MRN: [id number]    Department:  THE [facility] Emergency Department in [place]   Date of Visit:  2/20/2020           Disclosure     Insurance plans vary and the physician(s) referred by the ER may not be covered by your plan.  Please contac tell this physician (or your personal doctor if your instructions are to return to your personal doctor) about any new or lasting problems. The primary care or specialist physician will see patients referred from the [facility] Emergency Department.  Omega Logan

## (undated) NOTE — ED AVS SNAPSHOT
THE [facility] Emergency Department in [address] [facility]    Phone:  [phone]    Fax:  [phone]           Sun Talaveratz   MRN: [id number]    Department:  THE [facility] Emergency Department in [place]   Date of Visit: [address] [place] [phone]  Pediatric [address] 3037 Emergency Department   [phone]       To Check ER Wait Times:  TEXT 'ERwait' to 22257      Click [email or web address]      Or call [phone]    If you have any will be contacted. Please make sure we have your correct phone number before you leave. After you leave, you should follow the attached instructions. I have read and understand the instructions given to me by my caregivers.         24[facility] XR CHEST PA + LAT CHEST (CPT=71020) (Final result) Result time:  01/23/17 22:50:52    Final result    Impression:    CONCLUSION:  Mild bronchitis without pneumonia.            Dictated by: Gael Bennett MD on 1/23/2017 at 22:50       Approved by: Andrae Parks

## (undated) NOTE — LETTER
July 22, 2022    Patient: Mary Braden   Date of Visit: 7/22/2022       To Whom It May Concern:    Mary Braden was seen and treated in our emergency department on 7/22/2022. She should not return to work until July 25. If you have any questions or concerns, please don't hesitate to call.        Encounter Provider(s):    Modesta Cheadle, MD

## (undated) NOTE — LETTER
[address] [facility] Department  Phone: [phone]  Right Fax: [phone]  [facility] [address] By:  Janeen Dewey RN    Date: 23    Patient Name: Zari Trammell  Surgery Date: 2023    CSN: [id number]  Medical Record: [id number]   : 1970 - A: 47 y      Sex: female    Surgeon(s):  Skyler Lock MD    Procedure: LUMBAR 4-LUMBAR 5 TRANSFORAMINAL LUMBAR INTERBODY FUSION WITH HARDWARE, Left - Spine Lumbar  INTRAOPERATIVE NEURO MONITORING, N/A    Procedure Comments: LUMBAR 4-LUMBAR 5 TRANSFORAMINAL LUMBAR INTERBODY FUSION WITH HARDWARE  Anesthesia Type: General    To Attending: Skyler Lock MD Fax #: [phone]  To PCP:  Dr. Alex May MD  Fax #: [phone]  To Other:   Fax #:     [address]   3  PAGES (Rebeca Sole)    ANESTHESIOLOGIST Dr. Lidia Spangler REQUESTED THE FOLLOWING:  NOTE [facility]  _______________________________________________________________  Please note the following attached testing results for your review:   Labs ~ low sodium from 8/3/23    [address], [address] [place] [address]  Phone: [phone]  Fax: [phone]  [email or web address]    STATEMENT OF CONFIDENTIALITY: This transmittal is intended only for the use of the individual entity to which is addressed and may contain information that is privileged and confidential. information contained. If the reader of this message IS NOT the intended recipient, you are hereby notified that any disclosure, distribution or copying of this information is strictly prohibited. If you have received this transmission in error, please notify us immediately by telephone and return the original documents to us at the above address via the [facility]. Thank you.

## (undated) NOTE — LETTER
OUTSIDE TESTING RESULT REQUEST     IMPORTANT: FOR YOUR IMMEDIATE ATTENTION  Please FAX all test results listed below to: [phone]     Testing already done on or about: 2023     * * * * If testing is NOT complete, arrange with patient A.S.A.P. * * * *      Patient Name: Elena Joe  Surgery Date: 2023  Medical Record: [id number]  CSN: [id number]  : 1970 - A: 47 y     Sex: female  Surgeon(s):  Marilee Lopez MD  Procedure: LUMBAR 4-LUMBAR 5 TRANSFORAMINAL LUMBAR INTERBODY FUSION WITH HARDWARE  Anesthesia Type: General     Surgeon: Marilee Lopez MD     The following Testing and Time Line are REQUIRED PER ANESTHESIA     PT/INR within  30 days  PTT within  30 days  UA with Reflex to Culture within  30 days  MSSA/MRSA Nasal screening within 30 days      Thank You,   Sent by: Dale Cox RN